# Patient Record
Sex: FEMALE | Race: WHITE | NOT HISPANIC OR LATINO | Employment: FULL TIME | URBAN - METROPOLITAN AREA
[De-identification: names, ages, dates, MRNs, and addresses within clinical notes are randomized per-mention and may not be internally consistent; named-entity substitution may affect disease eponyms.]

---

## 2022-12-29 ENCOUNTER — OFFICE VISIT (OUTPATIENT)
Dept: PRIMARY CARE CLINIC | Facility: CLINIC | Age: 27
End: 2022-12-29
Payer: COMMERCIAL

## 2022-12-29 VITALS
OXYGEN SATURATION: 100 % | DIASTOLIC BLOOD PRESSURE: 74 MMHG | SYSTOLIC BLOOD PRESSURE: 112 MMHG | BODY MASS INDEX: 31.24 KG/M2 | RESPIRATION RATE: 18 BRPM | WEIGHT: 176.38 LBS | HEART RATE: 110 BPM

## 2022-12-29 DIAGNOSIS — Z83.3 FAMILY HISTORY OF DIABETES MELLITUS (DM): ICD-10-CM

## 2022-12-29 DIAGNOSIS — E66.9 CLASS 1 OBESITY: ICD-10-CM

## 2022-12-29 DIAGNOSIS — Z86.39 HISTORY OF INSULIN RESISTANCE: Primary | ICD-10-CM

## 2022-12-29 PROBLEM — E66.811 CLASS 1 OBESITY: Status: ACTIVE | Noted: 2022-12-29

## 2022-12-29 PROCEDURE — 1159F MED LIST DOCD IN RCRD: CPT | Mod: CPTII,S$GLB,, | Performed by: FAMILY MEDICINE

## 2022-12-29 PROCEDURE — 1159F PR MEDICATION LIST DOCUMENTED IN MEDICAL RECORD: ICD-10-PCS | Mod: CPTII,S$GLB,, | Performed by: FAMILY MEDICINE

## 2022-12-29 PROCEDURE — 3078F DIAST BP <80 MM HG: CPT | Mod: CPTII,S$GLB,, | Performed by: FAMILY MEDICINE

## 2022-12-29 PROCEDURE — 3008F BODY MASS INDEX DOCD: CPT | Mod: CPTII,S$GLB,, | Performed by: FAMILY MEDICINE

## 2022-12-29 PROCEDURE — 99999 PR PBB SHADOW E&M-EST. PATIENT-LVL IV: CPT | Mod: PBBFAC,,, | Performed by: FAMILY MEDICINE

## 2022-12-29 PROCEDURE — 99203 OFFICE O/P NEW LOW 30 MIN: CPT | Mod: S$GLB,,, | Performed by: FAMILY MEDICINE

## 2022-12-29 PROCEDURE — 99203 PR OFFICE/OUTPT VISIT, NEW, LEVL III, 30-44 MIN: ICD-10-PCS | Mod: S$GLB,,, | Performed by: FAMILY MEDICINE

## 2022-12-29 PROCEDURE — 1160F PR REVIEW ALL MEDS BY PRESCRIBER/CLIN PHARMACIST DOCUMENTED: ICD-10-PCS | Mod: CPTII,S$GLB,, | Performed by: FAMILY MEDICINE

## 2022-12-29 PROCEDURE — 1160F RVW MEDS BY RX/DR IN RCRD: CPT | Mod: CPTII,S$GLB,, | Performed by: FAMILY MEDICINE

## 2022-12-29 PROCEDURE — 3008F PR BODY MASS INDEX (BMI) DOCUMENTED: ICD-10-PCS | Mod: CPTII,S$GLB,, | Performed by: FAMILY MEDICINE

## 2022-12-29 PROCEDURE — 3078F PR MOST RECENT DIASTOLIC BLOOD PRESSURE < 80 MM HG: ICD-10-PCS | Mod: CPTII,S$GLB,, | Performed by: FAMILY MEDICINE

## 2022-12-29 PROCEDURE — 3074F SYST BP LT 130 MM HG: CPT | Mod: CPTII,S$GLB,, | Performed by: FAMILY MEDICINE

## 2022-12-29 PROCEDURE — 3074F PR MOST RECENT SYSTOLIC BLOOD PRESSURE < 130 MM HG: ICD-10-PCS | Mod: CPTII,S$GLB,, | Performed by: FAMILY MEDICINE

## 2022-12-29 PROCEDURE — 99999 PR PBB SHADOW E&M-EST. PATIENT-LVL IV: ICD-10-PCS | Mod: PBBFAC,,, | Performed by: FAMILY MEDICINE

## 2022-12-29 RX ORDER — SEMAGLUTIDE 1.34 MG/ML
INJECTION, SOLUTION SUBCUTANEOUS
Qty: 1 PEN | Refills: 1 | Status: SHIPPED | OUTPATIENT
Start: 2022-12-29 | End: 2023-02-20 | Stop reason: SDUPTHER

## 2022-12-29 NOTE — PATIENT INSTRUCTIONS
"Nutritional Yeast (1.5-2 tbsp= serving)  Parish Rice  Eleno Kesha bread   Lesser Evil popcorn  Beanitos          PRODUCE  [] All fresh fruit   [] All fresh vegetables   [] All fresh herbs  [] All herb purees + pastes  [] Pre-spiralized vegetable noodles   [] Steam-In-The-Bag begetables  [] Riced cauliflower  [] Jicama sticks  [] Love Beets  all varieties  [] Wholly Guacamole  all varieties  [] Hummus  all varieties, chickpea + vegetable  [] Tofu Shirataki noodles    [] Tofu  all varieties  [] Tempeh  all varieties    PROTEIN  CHICKEN   [] Boneless, skinless breasts  [] Boneless, skinless thighs  [] Ground chicken breast, at least 93% lean  [] Chicken breast cutlet  [] Aidell's  Chicken Sausage + Chicken Meatballs    TURKEY   [] Turkey breast tenderloin   [] Ground turkey breast, at least 93% lean  [] [place] Naturals  Turkey Sausage    BEEF  [] Tenderloin  [] Sirloin  [] Top Loin  [] Flank Steak  [] Round Steak  [] Filet  [] Lean ground beef, at least 93% lean + grass-fed preferable    PORK  [] Tenderloin  [] Pork Chop  [] Center Cut  [] Aimee Naturals  No-Sugar Rivera    BISON  [] Duson  90 - 95% lean    SEAFOOD  [] All fresh fish + seafood; locally sourced when possible  [] Smoked salmon    HEAT + EAT ENTREES   [] Carlton's Natural Foods  Chicken, Pork, Beef  [] Von  "All Natural" Grilled Chicken Breast + Strips, all varieties    SAUCES SPREADS + DIPS  [] Bitchin Sauce  Original, Chipotle, Cilantro [place]  [] Melecio's Kitchen  Tzatziki Yogurt Dip, Babaganoush, Hummus  [] Wholly Guacamole  all varieties  [] Hummus  all varieties  [] Errol Gringo Salsa  all varieties  [] Mrs. Lashon's Salsa  all varieties  [] Stubb's All Natural BBQ Sauce  [] Primal Kitchen  Young, Ketchup, BBQ Sauce  [] Primal Kitchen Pasta Sauce  Roasted Garlic, Tomato Basil, no-dairy Vodka Sauce  [] Sal & Mariana's  HeartSmart Pasta Sauce    DAIRY/DAIRY SUBSTITUTES/EGGS  EGGS   [] All eggs  cage-free, pasture-raise " preferable  [] Crepini  egg wraps  [] Vital Farms  Pasture-Raised Egg Bites  [] JUST Egg [vegan]     CREAMERS   [] Califia  Better Half, original + vanilla unsweetened  [] NutPods  all varieties    MILK   [] Horizon Organic  all varieties except chocolate  [] Organic Valley  all varieties except chocolate  [] Organic Valley  ultra-filtered, reduced fat milk     PLANT_BASED MILK ALTERNATIVES  [] All unsweetened almond milks  original, vanilla + chocolate  [] Ripple  unsweetened   [] Milkadamia  original +_ vanilla, unsweetened   [] Forager  original + vanilla, unsweetened   [] Silk Organic  soy milk, unsweetened  [] Oatly  unsweetened  [] Califia  regular + protein-fortified oat milk, unsweetened     CHEESES  [] Regular or reduced fat cheeses  [] BelGioso  Fresh Mozzarella Snack Packs, Parmesan Power-full Snack   [] Goat cheese  [] Fresh mozzarella  [] String cheese  all varieties  [] Maria R Cottage Cheese  [] Camelia's Cultured Cottage Cheese  [] Tanesha Life 'Just Like Mozzarella'  plant-based shreds and other varieties  [] Parmela Creamery  plant-based shredded cheese    YOGURT  [] Fage  2% low-fat, plain  [] Siggi's  plain, vanilla  [] Chobani Greek  nonfat + whole milk yogurt, plain   [] Chobani Less Sugar  all flavored varieties   [] Oikos Greek  nonfat, plain  [] Two Good  all varieties   [] Telugu Provisions  plain  [] [place] Organic  low-fat + nonfat, plain  [] [facility]  goat milk yogurt, plain  [] Kefit  unsweetened, plain  [] Forager  Greek style unsweetened, plain [dairy-free]  [] [place]  unsweetened Greek style, plain [dairy-free]  [] [facility]  almond milk yogurt, vanilla or plain, unsweetened [dairy-free]    FREEZER SECTION  FROZEN VEGGIES  [] All plain frozen veggies + greens [e.g. broccoli, brussels, carrots, okra, mushrooms, zucchini, yellow squash, butternut squash, kale, spinach, hayley greens]  [] Riced veggies [e.g. cauliflower, broccoli, butternut  squash]  [] Edamame  all varieties  [] Green Giant  [] Veggie Spirals  [] Marinated Veggies [e.g. eggplant, peppers, zucchini]  [] Simply Steam [place] Sprouts  [] Birds Eye  [] Power Blend Italian Style  lentils, broccoli, kale, zucchini  []  [place] Sprouts & Carrots  [] Oven Roasters Broccoli & Cauliflower  [] [place] Blend  [] Tattooed   [] Green Bean Blend  [] Farmer's Market Ratatouille  [] Butter Balsamic Glazed Vegetables  [] Riced Cauliflower & Quinoa Mediterranean Style  [] Lupis's Good Life  Southern Style Greens [sauteed kale + onion]    FROZEN FRUITS  [] All unsweetened frozen fruits  all varieties  [] Dole Fruits & Veggie Blends  Berries 'n Kale  [] Dole Mix-ins  Triple Berry     FROZEN ENTREES  [] The Good Kitchen meals  all varieties [ e.g. Chili Lime Chicken Over Riced Cauliflower]  [] Premium Paleo  Not Stanley Momma's Meatloaf  [] Primal Kitchen  Chicken Pesto + Steak Fajitas w/ Peppers & Onions  [] Eating Well Frozen Entrees  Butter Chicken Masala, Steak Carne Asada, Creamy Pesto Chicken, Chicken + Wild Rice Stroganoff, Yellow Jones Chicken, Sun-dried Tomato Chicken, Chicken Lo Mein  [] Realgood Entree Bowls  Sammarinese Inspired Beef Bowl over Riced Cauliflower, Chicken Burrito Bowl   [] Great Karma Coconut Jones  [] Maricel's  Tamale Abril with Black Beans, Vegetable Lasagna  [] Kashi Mayan Blairs Bake  [] Healthy Choice  Simply Steamers Chicken Fried Rice  [] Basil Pesto Chicken & Central African Style Pork Power Bowls  [] Tattooed   Enchilada Bowl  [] Lalo Farms  Spicy Black Bean Burgers    FROZEN PIZZAS  [] Cauli'flour Foods  Cauliflower Pizza Crusts  [] Outer Aisle  Cauliflower Crust  [] Maricel's  Veggie Crust Cheese Pizza  [] Quest Pizza     VEGETARIAN PRODUCTS  [] Beyond Meat  ground 'meat' + grilled 'chicken' strips  [] Tofurkey  Original Italian Sausage + Original Tempeh  [] Gardein  Beefless Ground + Meatless Meatballs  [] [facility] Judith   Original Burger, Crumbles, Meatballs    ICE CREAMS + FROZEN DESSERTS  [] Halo Top  regular + keto series, pops  [] Rebel  ice cream + dessert sandwiches  [] Enlightened  ice cream + bars  [] Nightfood  ice cream  [] Realgood  ice cream  [] Arctic Zero Fit  frozen pint  [] The Frozen Farmer  sorbets  [] Wholly Rollies  Protein Balls, all varieties  [] Dream Pops  Coconut Latte    FROZEN BREAKFASTS  [] Realgood  Breakfast Sandwiches on Cauliflower Cheesy Bread  [] Rebel  ice cream + dessert sandwiches  [] Enlightened  ice cream + bars  [] Nightfood  ice cream  [] Realgood  ice cream  [] Arctic Zero Fit  frozen pint  [] The Frozen Farmer  sorbets  [] Wholly Rollies  Protein Balls, all varieties  [] Dream Pops  Coconut Latte    BREADS/BUNS/WRAPS  [] Benjamin Bread: All Types - In Freezer Section   [] Flat Out Light Wraps - All Varieties   [] Flat Out Protein Up Carb Down Flat Bread   [] Kontos Whole Wheat Pocket Kesha   [] Gautam NICHOLAS 100% Whole Wheat Tortillas   [] LaTortilla Factory Tortillas - Smart & Delicious; 50 or 80-calorie   [] Nature's Own 100% Whole Wheat Bread   [] Orowheat Healthful - 100% Whole Wheat Slice Bread and Howard Thins   [] Orowheat Healthful - Whole Wheat Nuts & Grain Bread; Flax & Seed Bread   [] Pepperidge Farm Natural Whole Grain 15 Bread   [] Pepperidge Farm Natural Whole Grain English Muffin - 100% Whole Wheat   [] [place] Farm Very Thin 100% Whole Wheat   [] Kaylin Selvin 45 Calories and Delightful   [] Iam' 100% Whole Wheat Thin-Sliced Bagels and English Muffins   [] Western Bagel: Perfect 10     GLUTEN FREE  [] Domingo's Gluten Free Bread   [] [place] Bakehouse 7-Grain Gluten Free Bread     LEGUME PASTA   [] Explore Asian Organic Black Bean Spaghetti   [] Modern Table   [] Tolerant Foods       NUT BUTTERS & JELLIES    NUT BUTTERS   [] Better'n Chocolate: Coconut Chocolate Peanut Butter Spread   [] Better'n Peanut Butter - All Types   [] Earth Balance Coconut and Peanut Spread    [] Francisco's Nut DeCordova   [] MaraNatha: All Natural Roasted Cashew Butter - Kent or Creamy   [] MaraNatha: Roasted Peanut Butter   [] Nuts 'N More Peanut DeCordova - All Flavors   [] PB2 Powder - Original or Chocolate   [] Skippy Natural - Creamy, Super Chunk   [] Smart Balance Peanut Butter - Kent or Creamy   [] Peanut Butter & Company:   [] Smooth , Crunch Time, The Heat Is On, Old Fashioned Smooth, Mighty Nut- Powdered Peanut Butter, Squeeze Pack   [] Smucker's Natural Peanut Butter - Kent or Creamy   [] Sunbutter Nut Butter   [] Wild Friends Protein Peanut Butter/Emerson o Butter - Vanilla or Chocolate     JELLIES  o Polaner's All Fruit   o Clearly Organic Best Choice: Strawberry Fruit Spread       SNACKS    BARS  [] Kashi Bars - Chewy or Crunchy; Honey Emerson o Flax or Peanut Butter   [] KIND Bars - 5 Grams of Sugar or Less   [] KIND Protein Bars - Strong and KIND   [] Nature Valley Protein Bar - All Varieties   [] Nature Valley Roasted Nut Crunch - Emerson Crunch; Peanut Crunch   [] Hassler Health Farm Simple Nut Bar - Roasted Peanut & Honey   [] Hassler Health Farm Simple Nut Bar - Emerson, Cashew & Sea Salt   [] Hassler Health Farm Nut Marathon Bar - Salted Caramel Peanut   [] Think Thin Protein Bars   [] Quest Bars, Power Crunch Bars, Pure Protein Bars     BEEF JERKY - NITRATE FREE   [] Game On   [] Grass Run Farms   [] Krave   [] Ostrim   [] Perky Jerky   [] Primal Strips Meatless Vegan Jerky   [] [place]     CHIPS   [] Beanitos Chips   [] Fruit Crisps - e.g. Brother's-All-Natural, Bare Fruit, Yoga Chips   [] Katie's Soy Crisps: 1.3 ounce bag   [] Quest Protein Chips   [] Wasa Whole Wheat Crisp Bread     CRACKERS  [] Babita's Gone Crackers   [] Nabisco Triscuit: Regular and Thin Crisp Crackers   [] Vans Say Cheese Crackers (G-F)     POPCORN/NUTS   [] Gordon Braswell's Smart Pop Popcorn - Single Serving   [] 100-Calorie Pack of Nuts - All Varieties     PROTEIN POWDERS & DRINKS  []  Protein -  Whey  Protein Powder   [] Garden of Life Raw Protein Powder   [] Iconic Ready-To-Drink Protein Drink   [] Richardson One Protein Powder   [] VegaSport Protein Powder     SALSA/HUMMUS/DIPS   [] Eat Well Embrace Life: Zesty Sriracha Carrot o Hummus   [] Pre-Portioned Guacamole Packs   [] Pearl's   [] Tostitos Restaurant Style Salsa       SOUPS   [] Maricel's Organic Soups - Lentil, Vegetable, Split Pea, Low-Sodium     CANNED GOODS   [] 100% Pure Pumpkin   [] BlueRunner Creole Cream-Style Red Beans or Navy Beans   [] Cajun Power Chicken Gumbo Base   [] Chicken of the Sea Goodman [place]   [] Andrés Fresh Cut Sliced Beets   [] Hormel Breast of Chicken in Water   [] LeSuer Tender Baby Whole Carrots   [] Nicole Tabasco [place] Starter   [] Carlosist: Chunk Lite Tuna in Water, Gourmet Select Pouches   [] Carlosist: Yellowfin Tuna Fillets   [] Trappey's: Kidney, Butter, Almendarez, Black Eye, Field, and Black Beans   [] CECIL Saunders's Turnip Greens or Torrey Spinach     CONDIMENTS/ SAUCES/SPREADS/ SPICES  [] Aneesh Cobb's Magic Seasonings - Regular or Salt Free   [] Yunior Tovar's Sauces - All Flavors   [] Laughing Cow Light - All Flavors   [] Dash Salt-Free Marinade - All Flavors   [] Hang & Mariana's: Heart Smart Pasta Sauces   [] Tabasco     SALAD DRESSINGS  [] Kristal's Naturals: Lite Honey Mustard   [] Mackey's Own: Lighten Up Salad Dressing - All Varieties   [] OPA Greek Yogurt Dressings - Ranch, Blue o Cheese, Caesar, Feta Dill     SWEETENERS  [] Sweet [place] Sweetener   [] Swerve   [] Truvia     BEVERAGES  [] Coconut Water   [] Crystal Light PURE - All Flavors   [] Honest Tea: Just Green Tea, Unsweetened   [] Kombucha Tea   [] [place]   [] [place] Sisters Bloody Babita Mix   [] Metromint - Zero-Sugar; All Natural Flavored   [] Jaime - Plain or Flavored   [] Mraguerite Padilla   [] Steaz - Zero-Sugar, All-Natural, Sparkling Tea   [] Tea Bags: Any Brand - e.g. Nikki, Yogi, Tazzo, Celestial   [] V8 100% Vegetable Juice   [] Vitamin Water Zero    [] Water   [] Zevia - Stevia Sweetened Soft Drink     BEER/NIKOLAY/LIQUORS  []Payne's Premier Light 64 Calories   [] Bud Select - 55 Calories   [] [place] Sisters Bloody Babita Mix   [] Costa Genuine Draft - 64 Calories   [] Red or White Wine - All Varieties     CEREALS: HOT/COLD   [] Argelia'nayeli Hahn's Original Cereal  [] Eldon's Mill Oat Bran Hot Cereal - Cracked Wheat, Multi-Grain  [] Kashi GoLean Cereal  [] Kashi GoLean Hot Cereal packets - Vanilla; Honey Cinnamon  [] Tong's Special K Protein Cereal  [] Nitin's Steel Cut Frisian Oatmeal  [] Nature's Path Smart Bran  [] Mu-ism Instant Oatmeal packet, Original  [] Mu-ism Old Fashioned Mu-ism Oats  [] Uncle Rajesh's Whole Wheat & Flaxseed Original Cereal

## 2022-12-29 NOTE — PROGRESS NOTES
Subjective:       Patient ID: Amanda Mcbride is a 27 y.o. female.    Pmhx, fam hx, soc hx, surg hx, allergies, med list reviewed    Referring MD: Lazaro  PCP: Babita Vitale  BMI noted 31  Diet: varibale  Exercise/Activity: light  Not as active with job  Sleep: good  Stressors: daughter 3  Anxiety/Depression Screen/PHQ-2: neg  Pt works in a plant/office  Had death in family and dog as well    Chief Complaint: weight gain    Hx of insulin resistance. How started metformin for this originally. Had labs earlier this year.   No hx of gestational DM   No known metabolic syndrome.   No blood pressure issues.       Does not like fruit    ON metformin--had gi upset and is resistant to trying again. Pt was on regular metformin.   Would like to try injectable. Was on this with [facility].    Pt had daughter in 2019: lost to 145 and felt well. She has spoken with Dr Willis about wanting to have another baby.     Has been snacking more: chips/larger portion, eats dinner and going to bed.   She does eat from boredom some times.     Bfast: hot pocket bfast (1) or instant grits/sometimes hungrier  Lunch: leftovers  Dinner: rice gravy (Parish), animal protein, veggies  Sometimes mashed potatoes  Occasional bread    Likes veggies  Water: okay    Soda: drinks 1-2/day but now going back to zero sugar  Coffee: Vanilla zero sugar          HPI  Review of Systems   Constitutional:  Negative for activity change, appetite change, fatigue and fever.   HENT:  Negative for mouth dryness and goiter.    Eyes:  Negative for visual disturbance.   Respiratory:  Negative for apnea, cough, chest tightness and shortness of breath.    Cardiovascular:  Negative for chest pain, palpitations and leg swelling.   Gastrointestinal:  Negative for abdominal pain, constipation and diarrhea.   Endocrine: Negative for cold intolerance, heat intolerance, polydipsia, polyphagia and polyuria.   Genitourinary:  Negative for frequency and menstrual problem.    Musculoskeletal:  Negative for arthralgias and myalgias.   Integumentary:  Negative for color change and rash.   Psychiatric/Behavioral:  Negative for sleep disturbance. The patient is not nervous/anxious.        Objective:      Physical Exam  Vitals and nursing note reviewed.   Constitutional:       General: She is not in acute distress.  HENT:      Head: Normocephalic and atraumatic.   Eyes:      General: No scleral icterus.     Pupils: Pupils are equal, round, and reactive to light.   Neck:      Comments: No TM  Cardiovascular:      Rate and Rhythm: Normal rate and regular rhythm.      Pulses: Normal pulses.      Heart sounds: Normal heart sounds. No murmur heard.    No friction rub. No gallop.   Pulmonary:      Effort: Pulmonary effort is normal. No respiratory distress.      Breath sounds: Normal breath sounds. No wheezing, rhonchi or rales.   Abdominal:      General: Bowel sounds are normal. There is no distension.      Palpations: Abdomen is soft.      Tenderness: There is no abdominal tenderness.   Musculoskeletal:         General: No swelling.      Cervical back: Normal range of motion and neck supple. No tenderness.   Lymphadenopathy:      Cervical: No cervical adenopathy.   Skin:     General: Skin is warm.      Capillary Refill: Capillary refill takes less than 2 seconds.      Findings: No erythema or rash.   Neurological:      Mental Status: She is alert and oriented to person, place, and time.   Psychiatric:         Mood and Affect: Mood normal.         Behavior: Behavior normal.       Assessment:       1. History of insulin resistance    2. Family history of diabetes mellitus (DM)    3. Class 1 obesity              Plan:       History of insulin resistance  -     semaglutide (OZEMPIC) 0.25 mg or 0.5 mg(2 mg/1.5 mL) pen injector; Inject 0.25 mg subq weekly x 2 weeks then 0.5 mg subq weekly  Dispense: 1 pen; Refill: 1    Family history of diabetes mellitus (DM)  -     semaglutide (OZEMPIC) 0.25 mg or  0.5 mg(2 mg/1.5 mL) pen injector; Inject 0.25 mg subq weekly x 2 weeks then 0.5 mg subq weekly  Dispense: 1 pen; Refill: 1    Class 1 obesity  -     semaglutide (OZEMPIC) 0.25 mg or 0.5 mg(2 mg/1.5 mL) pen injector; Inject 0.25 mg subq weekly x 2 weeks then 0.5 mg subq weekly  Dispense: 1 pen; Refill: 1          40 min total spent with patient    Some food choices:  Nutritional yeast  Parish rice  Iced coffee/protein shakes  Pt is aware of lifestyle change    Risks/benefits/common side effects of medication discussed with patient at length. UTD patient handout given. (mychart msg)  D/W pt at length potential pharmacologic options; she desires consideration of ozempic. Risks/benefits/common side effects of ozempic d/w pt including nausea and pain at injection site; she is aware should not get pregnant while taking and not approved while breastfeeding. NO personal/fam hx of MEN or medullary thyroid cancer. No hx of pancreatitis. Instructed pt how to use with demo pen; pt practiced in office. Pt advised if approved will get pt handout from pharmacy. Pt has no hx of thyroid nodules or biliary disease/gallstones. DW pt with substantial or rapid weight loss gallstones could develop. Also dw pt glp-1 MOA and common side effects.        F/u 2-4 weeks

## 2023-01-02 ENCOUNTER — PATIENT MESSAGE (OUTPATIENT)
Dept: PRIMARY CARE CLINIC | Facility: CLINIC | Age: 28
End: 2023-01-02
Payer: COMMERCIAL

## 2023-02-03 ENCOUNTER — PATIENT MESSAGE (OUTPATIENT)
Dept: PRIMARY CARE CLINIC | Facility: CLINIC | Age: 28
End: 2023-02-03
Payer: COMMERCIAL

## 2023-02-10 ENCOUNTER — OFFICE VISIT (OUTPATIENT)
Dept: PRIMARY CARE CLINIC | Facility: CLINIC | Age: 28
End: 2023-02-10
Payer: COMMERCIAL

## 2023-02-10 VITALS — HEIGHT: 63 IN | BODY MASS INDEX: 29.77 KG/M2 | WEIGHT: 168 LBS

## 2023-02-10 DIAGNOSIS — E66.9 CLASS 1 OBESITY: ICD-10-CM

## 2023-02-10 DIAGNOSIS — Z86.39 HISTORY OF INSULIN RESISTANCE: Primary | ICD-10-CM

## 2023-02-10 PROCEDURE — 99213 OFFICE O/P EST LOW 20 MIN: CPT | Mod: 95,,, | Performed by: FAMILY MEDICINE

## 2023-02-10 PROCEDURE — 99213 PR OFFICE/OUTPT VISIT, EST, LEVL III, 20-29 MIN: ICD-10-PCS | Mod: 95,,, | Performed by: FAMILY MEDICINE

## 2023-02-10 PROCEDURE — 3008F PR BODY MASS INDEX (BMI) DOCUMENTED: ICD-10-PCS | Mod: CPTII,95,, | Performed by: FAMILY MEDICINE

## 2023-02-10 PROCEDURE — 1160F PR REVIEW ALL MEDS BY PRESCRIBER/CLIN PHARMACIST DOCUMENTED: ICD-10-PCS | Mod: CPTII,95,, | Performed by: FAMILY MEDICINE

## 2023-02-10 PROCEDURE — 1159F PR MEDICATION LIST DOCUMENTED IN MEDICAL RECORD: ICD-10-PCS | Mod: CPTII,95,, | Performed by: FAMILY MEDICINE

## 2023-02-10 PROCEDURE — 3008F BODY MASS INDEX DOCD: CPT | Mod: CPTII,95,, | Performed by: FAMILY MEDICINE

## 2023-02-10 PROCEDURE — 1160F RVW MEDS BY RX/DR IN RCRD: CPT | Mod: CPTII,95,, | Performed by: FAMILY MEDICINE

## 2023-02-10 PROCEDURE — 1159F MED LIST DOCD IN RCRD: CPT | Mod: CPTII,95,, | Performed by: FAMILY MEDICINE

## 2023-02-10 NOTE — PROGRESS NOTES
Subjective:       Patient ID: Amanda Mcbride is a 27 y.o. female.  Pmhx, fam hx, soc hx, surg hx, allergies, med list reviewed    The [place] location is: [place]  The chief complaint leading to consultation is: follow up    Visit type: audiovisual    Face to Face time with patient: 17  22 minutes of total time spent on the encounter, which includes face to face time and non-face to face time preparing to see the patient (eg, review of tests), Obtaining and/or reviewing separately obtained history, Documenting clinical information in the electronic or other health record, Independently interpreting results (not separately reported) and communicating results to the patient/family/caregiver, or Care coordination (not separately reported).         Each patient to whom he or she provides medical services by telemedicine is:  (1) informed of the relationship between the physician and patient and the respective role of any other health care provider with respect to management of the patient; and (2) notified that he or she may decline to receive medical services by telemedicine and may withdraw from such care at any time.      Chief Complaint: follow up    Pt reports doing well overall. Still has an appetite but when eats is able to tolerate less.   Has been on the medication for a month. NO constipation, abd pain, neck/throat pain. NO nausea.    Tolerating ocp. Hx of class 1 obesity and insulin resistance.   She has had no access issues.   Sleep has been good.   Exercise: some but not as much as would like. Yesterday walked 1 mile at work/lunch break.  BMI down into 20s--from 176. Feeling very well; not sluggish, more energy.     NO pregnancy concerns.       HPI  Review of Systems   Constitutional:  Negative for activity change, appetite change, fatigue and fever.   HENT:  Negative for mouth dryness and goiter.    Eyes:  Negative for visual disturbance.   Respiratory:  Negative for apnea, cough, chest tightness and  shortness of breath.    Cardiovascular:  Negative for chest pain, palpitations and leg swelling.   Gastrointestinal:  Negative for abdominal pain, constipation and diarrhea.   Endocrine: Negative for cold intolerance, heat intolerance, polydipsia, polyphagia and polyuria.   Genitourinary:  Negative for frequency and menstrual problem.   Musculoskeletal:  Negative for arthralgias and myalgias.   Integumentary:  Negative for color change and rash.   Psychiatric/Behavioral:  Negative for sleep disturbance. The patient is not nervous/anxious.        Objective:      Physical Exam  Constitutional:       General: She is not in acute distress.     Appearance: Normal appearance.   HENT:      Head: Normocephalic and atraumatic.   Eyes:      General: No scleral icterus.  Pulmonary:      Effort: Pulmonary effort is normal. No respiratory distress.   Neurological:      Mental Status: She is alert and oriented to person, place, and time.   Psychiatric:         Mood and Affect: Mood normal.         Behavior: Behavior normal.       Assessment:         1. History of insulin resistance    2. Class 1 obesity        Plan:       History of insulin resistance  Class 1 obesity  BMI down from class 1 to 29 (depending on subjective home scale weights)  Pt to stay on 0.5 mg at this time; will plan to increase to 1 mg in the future if needed  Increase exercise by 2x/week      Declines different appt for lifestyle interventions at this time; may be open to it in the future

## 2023-03-06 ENCOUNTER — PATIENT MESSAGE (OUTPATIENT)
Dept: PRIMARY CARE CLINIC | Facility: CLINIC | Age: 28
End: 2023-03-06
Payer: COMMERCIAL

## 2023-03-06 DIAGNOSIS — Z83.3 FAMILY HISTORY OF DIABETES MELLITUS (DM): ICD-10-CM

## 2023-03-06 DIAGNOSIS — Z86.39 HISTORY OF INSULIN RESISTANCE: Primary | ICD-10-CM

## 2023-03-06 DIAGNOSIS — E66.9 CLASS 1 OBESITY: ICD-10-CM

## 2023-03-06 RX ORDER — SEMAGLUTIDE 1.34 MG/ML
1 INJECTION, SOLUTION SUBCUTANEOUS
Qty: 1 PEN | Status: SHIPPED | OUTPATIENT
Start: 2023-03-06 | End: 2023-04-24 | Stop reason: SDUPTHER

## 2023-04-24 ENCOUNTER — PATIENT MESSAGE (OUTPATIENT)
Dept: PRIMARY CARE CLINIC | Facility: CLINIC | Age: 28
End: 2023-04-24
Payer: COMMERCIAL

## 2023-04-24 DIAGNOSIS — E66.9 CLASS 1 OBESITY: ICD-10-CM

## 2023-04-24 DIAGNOSIS — Z83.3 FAMILY HISTORY OF DIABETES MELLITUS (DM): ICD-10-CM

## 2023-04-24 DIAGNOSIS — Z86.39 HISTORY OF INSULIN RESISTANCE: ICD-10-CM

## 2023-04-24 RX ORDER — SEMAGLUTIDE 1.34 MG/ML
1 INJECTION, SOLUTION SUBCUTANEOUS
Qty: 3 ML | Status: SHIPPED | OUTPATIENT
Start: 2023-04-24 | End: 2023-05-02 | Stop reason: SDUPTHER

## 2023-05-02 ENCOUNTER — PATIENT MESSAGE (OUTPATIENT)
Dept: PRIMARY CARE CLINIC | Facility: CLINIC | Age: 28
End: 2023-05-02
Payer: COMMERCIAL

## 2023-05-02 DIAGNOSIS — E66.9 CLASS 1 OBESITY: ICD-10-CM

## 2023-05-02 DIAGNOSIS — Z83.3 FAMILY HISTORY OF DIABETES MELLITUS (DM): ICD-10-CM

## 2023-05-02 DIAGNOSIS — Z86.39 HISTORY OF INSULIN RESISTANCE: ICD-10-CM

## 2023-05-02 RX ORDER — SEMAGLUTIDE 1.34 MG/ML
1 INJECTION, SOLUTION SUBCUTANEOUS
Qty: 3 ML | Status: SHIPPED | OUTPATIENT
Start: 2023-05-02 | End: 2023-05-09

## 2023-05-05 ENCOUNTER — PATIENT MESSAGE (OUTPATIENT)
Dept: PRIMARY CARE CLINIC | Facility: CLINIC | Age: 28
End: 2023-05-05
Payer: COMMERCIAL

## 2023-05-05 DIAGNOSIS — Z83.3 FAMILY HISTORY OF DIABETES MELLITUS (DM): ICD-10-CM

## 2023-05-05 DIAGNOSIS — Z86.39 HISTORY OF INSULIN RESISTANCE: Primary | ICD-10-CM

## 2023-05-05 DIAGNOSIS — E66.9 CLASS 1 OBESITY: ICD-10-CM

## 2023-05-09 RX ORDER — SEMAGLUTIDE 1.34 MG/ML
0.5 INJECTION, SOLUTION SUBCUTANEOUS
Qty: 3 ML | Refills: 1 | Status: SHIPPED | OUTPATIENT
Start: 2023-05-09 | End: 2023-07-05

## 2023-05-30 ENCOUNTER — OFFICE VISIT (OUTPATIENT)
Dept: PRIMARY CARE CLINIC | Facility: CLINIC | Age: 28
End: 2023-05-30
Payer: COMMERCIAL

## 2023-05-30 DIAGNOSIS — E66.9 CLASS 1 OBESITY: ICD-10-CM

## 2023-05-30 DIAGNOSIS — Z86.39 HISTORY OF INSULIN RESISTANCE: Primary | ICD-10-CM

## 2023-05-30 DIAGNOSIS — R11.0 NAUSEA: ICD-10-CM

## 2023-05-30 PROCEDURE — 1159F MED LIST DOCD IN RCRD: CPT | Mod: CPTII,95,, | Performed by: FAMILY MEDICINE

## 2023-05-30 PROCEDURE — 1160F PR REVIEW ALL MEDS BY PRESCRIBER/CLIN PHARMACIST DOCUMENTED: ICD-10-PCS | Mod: CPTII,95,, | Performed by: FAMILY MEDICINE

## 2023-05-30 PROCEDURE — 1159F PR MEDICATION LIST DOCUMENTED IN MEDICAL RECORD: ICD-10-PCS | Mod: CPTII,95,, | Performed by: FAMILY MEDICINE

## 2023-05-30 PROCEDURE — 99213 OFFICE O/P EST LOW 20 MIN: CPT | Mod: 95,,, | Performed by: FAMILY MEDICINE

## 2023-05-30 PROCEDURE — 99213 PR OFFICE/OUTPT VISIT, EST, LEVL III, 20-29 MIN: ICD-10-PCS | Mod: 95,,, | Performed by: FAMILY MEDICINE

## 2023-05-30 PROCEDURE — 1160F RVW MEDS BY RX/DR IN RCRD: CPT | Mod: CPTII,95,, | Performed by: FAMILY MEDICINE

## 2023-05-30 NOTE — PROGRESS NOTES
Subjective   Pmhx, fam hx, soc hx, surg hx, allergies, med list reviewed    The patient location is: [place]  The chief complaint leading to consultation is: follow up    Visit type: audiovisual    Face to Face time with patient: 10  12 minutes of total time spent on the encounter, which includes face to face time and non-face to face time preparing to see the patient (eg, review of tests), Obtaining and/or reviewing separately obtained history, Documenting clinical information in the electronic or other health record, Independently interpreting results (not separately reported) and communicating results to the patient/family/caregiver, or Care coordination (not separately reported).         Each patient to whom he or she provides medical services by telemedicine is:  (1) informed of the relationship between the physician and patient and the respective role of any other health care provider with respect to management of the patient; and (2) notified that he or she may decline to receive medical services by telemedicine and may withdraw from such care at any time.    Notes:    Patient ID: Amanda Mcbride is a 28 y.o. female.    Chief Complaint: f/u weight gain/insulin resistance  Pt reports insurance is paying for ozempic. She had  nausea 2 months ago. Tended to be 2-3 days after injection. Had n/v. Zofran did help. She stopped and restarted on lower dosage.     Pt had eye check up and was okay.    Denies pregnancy concerns.     Pt is on 0.25 mg dosage for that box. She has had heightened appetite. She will increase to 0.5 mg. No constipation. NO GERD. No throat/neck pain. No abd pain    Pt self reported weight 165-167.    HPI  Review of Systems   Constitutional:  Negative for activity change, appetite change, fatigue and fever.   HENT:  Negative for mouth dryness and goiter.    Eyes:  Negative for visual disturbance.   Respiratory:  Negative for apnea, cough, chest tightness and shortness of breath.     Cardiovascular:  Negative for chest pain, palpitations and leg swelling.   Gastrointestinal:  Negative for abdominal pain, constipation and diarrhea.   Endocrine: Negative for cold intolerance, heat intolerance, polydipsia, polyphagia and polyuria.   Genitourinary:  Negative for frequency and menstrual problem.   Musculoskeletal:  Negative for arthralgias and myalgias.   Integumentary:  Negative for color change and rash.   Psychiatric/Behavioral:  Negative for sleep disturbance. The patient is not nervous/anxious.         Objective     Physical Exam  Vitals and nursing note reviewed.   Constitutional:       General: She is not in acute distress.  HENT:      Head: Normocephalic and atraumatic.   Eyes:      General: No scleral icterus.     Pupils: Pupils are equal, round, and reactive to light.   Neck:      Comments: No TM  Cardiovascular:      Rate and Rhythm: Normal rate and regular rhythm.      Pulses: Normal pulses.      Heart sounds: Normal heart sounds. No murmur heard.    No friction rub. No gallop.   Pulmonary:      Effort: Pulmonary effort is normal. No respiratory distress.      Breath sounds: Normal breath sounds. No wheezing, rhonchi or rales.   Abdominal:      General: Bowel sounds are normal. There is no distension.      Palpations: Abdomen is soft.      Tenderness: There is no abdominal tenderness.   Musculoskeletal:         General: No swelling.      Cervical back: Normal range of motion and neck supple. No tenderness.   Lymphadenopathy:      Cervical: No cervical adenopathy.   Skin:     General: Skin is warm.      Findings: No erythema or rash.   Neurological:      Mental Status: She is alert and oriented to person, place, and time.   Psychiatric:         Mood and Affect: Mood normal.         Behavior: Behavior normal.          Assessment and Plan     1. History of insulin resistance  2. Class 1 obesity    History of insulin resistance  Comments:  pt restarted ozempic; tolerating thus  far    Chronic/recent flare/increased appetite-titrating ozempic back up  Class 1 obesity  Comments:  on glp-1  continue lifestyle measures  Goal: resume exercise 2x/week    Nausea  Comments:  usually 2 days after injection; have dw pt ways to prevent        DW pt next goals      Pt to let me know how is doing with mychart update 2 weeks  She will schedule online in 2 mos    No follow-ups on file.

## 2023-07-05 DIAGNOSIS — E66.9 CLASS 1 OBESITY: ICD-10-CM

## 2023-07-05 DIAGNOSIS — Z86.39 HISTORY OF INSULIN RESISTANCE: ICD-10-CM

## 2023-07-05 DIAGNOSIS — Z83.3 FAMILY HISTORY OF DIABETES MELLITUS (DM): ICD-10-CM

## 2023-07-05 RX ORDER — SEMAGLUTIDE 0.68 MG/ML
INJECTION, SOLUTION SUBCUTANEOUS
Qty: 3 ML | Refills: 0 | Status: SHIPPED | OUTPATIENT
Start: 2023-07-05

## 2023-07-05 NOTE — TELEPHONE ENCOUNTER
Refill Routing Note   Medication(s) are not appropriate for processing by [facility] for the following reason(s):      Patient not seen by PCP within 15 months  Patient seen in ED/Hospital since LOV with PCP  Required labs outdated  Responsible provider unclear    [facility] action(s):  Defer None identified            Appointments  past 12m or future 3m with PCP    Date Provider   Last Visit   5/30/2023 Adelina Goode MD   Next Visit   Visit date not found Adelina Goode MD   ED visits in past 90 days: 0        Note composed:11:05 AM 07/05/2023

## 2023-08-02 ENCOUNTER — PATIENT MESSAGE (OUTPATIENT)
Dept: PRIMARY CARE CLINIC | Facility: CLINIC | Age: 28
End: 2023-08-02
Payer: COMMERCIAL

## 2023-09-07 ENCOUNTER — OFFICE VISIT (OUTPATIENT)
Dept: ALLERGY | Facility: CLINIC | Age: 28
End: 2023-09-07
Payer: COMMERCIAL

## 2023-09-07 ENCOUNTER — LAB VISIT (OUTPATIENT)
Dept: LAB | Facility: HOSPITAL | Age: 28
End: 2023-09-07
Attending: FAMILY MEDICINE
Payer: COMMERCIAL

## 2023-09-07 VITALS
DIASTOLIC BLOOD PRESSURE: 65 MMHG | TEMPERATURE: 98 F | HEART RATE: 92 BPM | WEIGHT: 171.94 LBS | SYSTOLIC BLOOD PRESSURE: 99 MMHG | BODY MASS INDEX: 31.64 KG/M2 | HEIGHT: 62 IN

## 2023-09-07 DIAGNOSIS — Z87.2 HISTORY OF COLD-INDUCED URTICARIA: ICD-10-CM

## 2023-09-07 DIAGNOSIS — L50.1 CHRONIC IDIOPATHIC URTICARIA: ICD-10-CM

## 2023-09-07 DIAGNOSIS — T78.1XXA ADVERSE FOOD REACTION, INITIAL ENCOUNTER: ICD-10-CM

## 2023-09-07 DIAGNOSIS — L50.1 CHRONIC IDIOPATHIC URTICARIA: Primary | ICD-10-CM

## 2023-09-07 DIAGNOSIS — J31.0 CHRONIC RHINITIS: ICD-10-CM

## 2023-09-07 PROCEDURE — 86343 LEUKOCYTE HISTAMINE RELEASE: CPT | Performed by: STUDENT IN AN ORGANIZED HEALTH CARE EDUCATION/TRAINING PROGRAM

## 2023-09-07 PROCEDURE — 99999 PR PBB SHADOW E&M-EST. PATIENT-LVL III: CPT | Mod: PBBFAC,,, | Performed by: STUDENT IN AN ORGANIZED HEALTH CARE EDUCATION/TRAINING PROGRAM

## 2023-09-07 PROCEDURE — 99204 PR OFFICE/OUTPT VISIT, NEW, LEVL IV, 45-59 MIN: ICD-10-PCS | Mod: S$GLB,,, | Performed by: STUDENT IN AN ORGANIZED HEALTH CARE EDUCATION/TRAINING PROGRAM

## 2023-09-07 PROCEDURE — 1159F PR MEDICATION LIST DOCUMENTED IN MEDICAL RECORD: ICD-10-PCS | Mod: CPTII,S$GLB,, | Performed by: STUDENT IN AN ORGANIZED HEALTH CARE EDUCATION/TRAINING PROGRAM

## 2023-09-07 PROCEDURE — 1159F MED LIST DOCD IN RCRD: CPT | Mod: CPTII,S$GLB,, | Performed by: STUDENT IN AN ORGANIZED HEALTH CARE EDUCATION/TRAINING PROGRAM

## 2023-09-07 PROCEDURE — 86038 ANTINUCLEAR ANTIBODIES: CPT | Performed by: STUDENT IN AN ORGANIZED HEALTH CARE EDUCATION/TRAINING PROGRAM

## 2023-09-07 PROCEDURE — 88184 FLOWCYTOMETRY/ TC 1 MARKER: CPT | Performed by: STUDENT IN AN ORGANIZED HEALTH CARE EDUCATION/TRAINING PROGRAM

## 2023-09-07 PROCEDURE — 99204 OFFICE O/P NEW MOD 45 MIN: CPT | Mod: S$GLB,,, | Performed by: STUDENT IN AN ORGANIZED HEALTH CARE EDUCATION/TRAINING PROGRAM

## 2023-09-07 PROCEDURE — 99999 PR PBB SHADOW E&M-EST. PATIENT-LVL III: ICD-10-PCS | Mod: PBBFAC,,, | Performed by: STUDENT IN AN ORGANIZED HEALTH CARE EDUCATION/TRAINING PROGRAM

## 2023-09-07 PROCEDURE — 86003 ALLG SPEC IGE CRUDE XTRC EA: CPT | Performed by: STUDENT IN AN ORGANIZED HEALTH CARE EDUCATION/TRAINING PROGRAM

## 2023-09-07 PROCEDURE — 3008F BODY MASS INDEX DOCD: CPT | Mod: CPTII,S$GLB,, | Performed by: STUDENT IN AN ORGANIZED HEALTH CARE EDUCATION/TRAINING PROGRAM

## 2023-09-07 PROCEDURE — 36415 COLL VENOUS BLD VENIPUNCTURE: CPT | Performed by: STUDENT IN AN ORGANIZED HEALTH CARE EDUCATION/TRAINING PROGRAM

## 2023-09-07 PROCEDURE — 3074F SYST BP LT 130 MM HG: CPT | Mod: CPTII,S$GLB,, | Performed by: STUDENT IN AN ORGANIZED HEALTH CARE EDUCATION/TRAINING PROGRAM

## 2023-09-07 PROCEDURE — 3078F DIAST BP <80 MM HG: CPT | Mod: CPTII,S$GLB,, | Performed by: STUDENT IN AN ORGANIZED HEALTH CARE EDUCATION/TRAINING PROGRAM

## 2023-09-07 PROCEDURE — 3078F PR MOST RECENT DIASTOLIC BLOOD PRESSURE < 80 MM HG: ICD-10-PCS | Mod: CPTII,S$GLB,, | Performed by: STUDENT IN AN ORGANIZED HEALTH CARE EDUCATION/TRAINING PROGRAM

## 2023-09-07 PROCEDURE — 3074F PR MOST RECENT SYSTOLIC BLOOD PRESSURE < 130 MM HG: ICD-10-PCS | Mod: CPTII,S$GLB,, | Performed by: STUDENT IN AN ORGANIZED HEALTH CARE EDUCATION/TRAINING PROGRAM

## 2023-09-07 PROCEDURE — 86003 ALLG SPEC IGE CRUDE XTRC EA: CPT | Mod: 59 | Performed by: STUDENT IN AN ORGANIZED HEALTH CARE EDUCATION/TRAINING PROGRAM

## 2023-09-07 PROCEDURE — 3008F PR BODY MASS INDEX (BMI) DOCUMENTED: ICD-10-PCS | Mod: CPTII,S$GLB,, | Performed by: STUDENT IN AN ORGANIZED HEALTH CARE EDUCATION/TRAINING PROGRAM

## 2023-09-07 PROCEDURE — 82785 ASSAY OF IGE: CPT | Performed by: STUDENT IN AN ORGANIZED HEALTH CARE EDUCATION/TRAINING PROGRAM

## 2023-09-07 PROCEDURE — 83520 IMMUNOASSAY QUANT NOS NONAB: CPT | Performed by: STUDENT IN AN ORGANIZED HEALTH CARE EDUCATION/TRAINING PROGRAM

## 2023-09-07 RX ORDER — EPINEPHRINE 0.3 MG/.3ML
1 INJECTION SUBCUTANEOUS ONCE
Qty: 0.3 ML | Refills: 0 | Status: SHIPPED | OUTPATIENT
Start: 2023-09-07 | End: 2023-09-07

## 2023-09-07 RX ORDER — AMOXICILLIN AND CLAVULANATE POTASSIUM 875; 125 MG/1; MG/1
1 TABLET, FILM COATED ORAL EVERY 12 HOURS
COMMUNITY
Start: 2023-08-19

## 2023-09-07 RX ORDER — LORATADINE 10 MG/1
10 TABLET ORAL
COMMUNITY

## 2023-09-07 NOTE — ASSESSMENT & PLAN NOTE
- Not major complaint but wanting environmental rule-out   - Ordered Serum Aeroallergen IgE unable to SPT due to chronic hives and current Claritin use   - Will continue to monitor and reassess

## 2023-09-07 NOTE — PROGRESS NOTES
Allergy and Immunology  New Patient Clinic Note    Date: 2023  Chief Complaint   Patient presents with    Rash     Hives      Referred by: Self, Aaareferral  No address on file    History  Amanda Mcbride is a 28 y.o. female being seen as a New Patient today.    Chronic Rhinitis   - Onset: a few years   - Symptoms: nasal pruritis and congestion   - Suspected triggers include: possible environmental - possible cologne but does not trigger rhinorrhea     - Vasomotor in the differential but not leading   - Medications: Oral antihistamines - for urticaria   - Nasal symptoms are less of an issue per patient     Chronic Urticaria/ Cold Induced urticaria   - Onset: childhood   - Symptoms: urticaria when changing in cold room, swimming, and night time when AC down   - Suspected triggers include: cold with   - Duration:< 24 hours   - Skin changes: No skin changes - no bruising or discoloration   - Medications: Claritin once daily typically worked but flare after recent infection   - Hx of Autoimmune Disease/Malignancy: No known personal but mother may have Hashimoto's   - Patient has not been in snow or cold water recently - educated on avoidance   - Patient does not take NSAIDs     Asthma   - No hx of asthma     CRSwNP  - No hx of CRSwNP     Eczema   - No hx of eczema     Food Allergy  - Banana: years ago - throat itching and upset stomach    - patient has avoided since without accidental exposure    - no issues with latex     Drug Allergy  - No hx of drug allergy     Recurrent Infections  - No hx of recurrent infections   - Recent sinus infection - possible viral URI with flare of chronic urticaria   - Not a pattern of infections though     Venom Allergy  - No hx of venom allergy     Allergies, PMH, PSH, Social, and Family History were reviewed.    Review of patient's allergies indicates:  No Known Allergies   No past medical history on file.  Past Surgical History:   Procedure Laterality Date     SECTION        Social History     Social History Narrative    Not on file     S/he reports that she has never smoked. She has never used smokeless tobacco. She reports current alcohol use. No history on file for drug use.    Current Outpatient Medications on File Prior to Visit   Medication Sig Dispense Refill    amoxicillin-clavulanate 875-125mg (AUGMENTIN) 875-125 mg per tablet Take 1 tablet by mouth every 12 (twelve) hours.      JUNEL FE 1.5/30, 28, 1.5 mg-30 mcg (21)/75 mg (7) tablet Take 1 tablet by mouth once daily. 3 tablet 2    loratadine (CLARITIN) 10 mg tablet Take 10 mg by mouth.      OZEMPIC 0.25 mg or 0.5 mg (2 mg/3 mL) pen injector INJECT 0.5MG INTO THE SKIN EVERY 7 DAYS (Patient not taking: Reported on 9/7/2023) 3 mL 0     No current facility-administered medications on file prior to visit.       Physical Examination  Vitals:    09/07/23 1344   BP: 99/65   Pulse: 92   Temp: 98.4 °F (36.9 °C)     GENERAL:  female in no apparent distress and well developed and well nourished  HEAD:  Normocephalic, without obvious abnormality, atraumatic  EYES: sclera anicteric, conjunctiva normochromic  EARS: normal TM's and external ear canals both ears  NOSE: without erythema or discharge, clear discharge, turbinates normal    OROPHARYNX: moist mucous membranes without erythema, exudates or petechiae  LYMPH NODES: normal, supple, no lymphadenopathy  LUNGS: clear to auscultation, no wheezes, rales or rhonchi, symmetric air entry.  HEART: normal rate, regular rhythm, normal S1, S2, no murmurs, rubs, clicks or gallops.  ABDOMEN: soft, nontender, nondistended, no masses or organomegaly.  MUSCULOSKELETAL: no gross joint deformity or swelling.  NEURO: alert, oriented, normal speech, no focal findings or movement disorder noted.  SKIN: normal coloration and turgor, no rashes, no suspicious skin lesions noted. No urticaria on presentation.   Below photos are the findings after ice cube test     Allergy Skin Tests  09/07/2023: Positive  ice cube test for cold induced urticaria             Assessment/Plan:   Problem List Items Addressed This Visit          ENT    Chronic rhinitis    Current Assessment & Plan     - Not major complaint but wanting environmental rule-out   - Ordered Serum Aeroallergen IgE unable to SPT due to chronic hives and current Claritin use   - Will continue to monitor and reassess          Relevant Orders    Aspergillus fumagatus IgE    Bermuda grass IgE    Cat epithelium IgE    Cladosporium IgE    Cockroach, American IgE    [place], bald IgE    D. farinae IgE    D. pteronyssinus IgE    Dog dander IgE    Plantain, English IgE    Eulogio grass IgE    Marsh elder, rough IgE    Mugwort IgE    Nettle IgE    Oak, white IgE    Penicillium IgE    Ragweed, short, common IgE    Matt IgE    Allergen, Cocklebur    Allergen, Elm [place]    Allergen, Meadow Grass ([employer] [place] Blue)    Allergen, Mucor Racemosus    Allergen, Pecan Tree IgE    Allergen, White [place]    Allergen-Alternaria Alternata    Allergen-[place]    Allergen-Common Pigweed    Allergen-Silver Birch    Feather Panel #2    RAST Allergen for [place]    RAST Allergen Maple ([place])    RAST Allergen [place]    RAST Allergen, Lamb's Quarters    RAST Allergen, Sheep Thor[place])    IgE    Tryptase    ALLERGEN BANANA    CU (Chronic Urticaria) Index Panel    Anti-IgE Receptor Antibody    EVER Screen w/Reflex       Derm    Chronic idiopathic urticaria - Primary    Overview     - 09/07/2023: Positive ice cube test for cold induced urticaria   - Since childhood with recent flare prior to establishing care (08/2023)           Current Assessment & Plan     - Hx since childhood with recent worsening after infection   - Triggers: cold rooms and cold water   - No hx of anaphylaxis   - Positive ice cube test at this appointment - confirmed diagnosis   - Recommended increasing oral antihistamine from daily to BID at this time  - Patient to get medication [facility]'s   - Discussed  risk and benefits of medications - avoid benadryl   - Ordered EpiPen for extreme cold exposure to do risk of progression to anaphylaxis  - Educated on possible progression to anaphylaxis with extreme temps  - Notify MD prior to traveling to cold climates   - ED precautions discussed at length   - Will continue to monitor and reassess          Relevant Orders    Aspergillus fumagatus IgE    Bermuda grass IgE    Cat epithelium IgE    Cladosporium IgE    Cockroach, American IgE    [place], bald IgE    D. farinae IgE    D. pteronyssinus IgE    Dog dander IgE    Plantain, English IgE    Eulogio grass IgE    Marsh elder, rough IgE    Mugwort IgE    Nettle IgE    Oak, white IgE    Penicillium IgE    Ragweed, short, common IgE    Matt IgE    Allergen, Cocklebur    Allergen, Elm [place]    Allergen, Meadow Grass ([employer] Blue)    Allergen, Mucor Racemosus    Allergen, Pecan Tree IgE    Allergen, White Devendra    Allergen-Alternaria Alternata    Allergen-[place]    Allergen-Common Pigweed    Allergen-Silver Birch    Feather Panel #2    RAST Allergen for [place]    RAST Allergen Maple ([place])    RAST Allergen [place]    RAST Allergen, Lamb's Quarters    RAST Allergen, Sheep [place]ellow Dock)    IgE    Tryptase    ALLERGEN BANANA    CU (Chronic Urticaria) Index Panel    Anti-IgE Receptor Antibody    EVER Screen w/Reflex    History of cold-induced urticaria    Overview     - 09/07/2023: Positive ice cube test for cold induced urticaria   - Since childhood with recent flare prior to establishing care (08/2023)            Other    Adverse food reaction    Overview     Banana - Lower suspicion  - Throat itching and upset stomach in childhood  - No exposure since so will evaluate with goal of adding to diet if possible           Follow up:  Follow up in about 2 months (around 11/7/2023).    [facility]  Allergy and Immunology

## 2023-09-07 NOTE — ASSESSMENT & PLAN NOTE
- Hx since childhood with recent worsening after infection   - Triggers: cold rooms and cold water   - No hx of anaphylaxis   - Positive ice cube test at this appointment - confirmed diagnosis   - Recommended increasing oral antihistamine from daily to BID at this time  - Patient to get medication from [facility]'s   - Discussed risk and benefits of medications - avoid benadryl   - Ordered EpiPen for extreme cold exposure to do risk of progression to anaphylaxis  - Educated on possible progression to anaphylaxis with extreme temps  - Notify MD prior to traveling to cold climates   - ED precautions discussed at length   - Will continue to monitor and reassess

## 2023-09-08 LAB
ANA SER QL IF: NORMAL
IGE SERPL-ACNC: 107 IU/ML (ref 0–100)

## 2023-09-11 ENCOUNTER — PATIENT MESSAGE (OUTPATIENT)
Dept: ALLERGY | Facility: CLINIC | Age: 28
End: 2023-09-11
Payer: COMMERCIAL

## 2023-09-11 LAB
A ALTERNATA IGE QN: <0.1 KU/L
A FUMIGATUS IGE QN: <0.1 KU/L
ALLERGEN BOXELDER MAPLE TREE IGE: 0.51 KU/L
ALLERGEN MAPLE (BOX ELDER) CLASS: ABNORMAL
ALLERGEN MULBERRY CLASS: ABNORMAL
ALLERGEN MULBERRY TREE IGE: 0.22 KU/L
ALLERGEN PIGWEED IGE: 0.38 KU/L
ALLERGEN WHITE ASH TREE IGE: 0.48 KU/L
AMER SYCAMORE IGE QN: 0.69 KU/L
BALD CYPRESS IGE QN: 0.24 KU/L
BANANA IGE QN: 0.39 KU/L
BERMUDA GRASS IGE QN: 4.07 KU/L
C HERBARUM IGE QN: <0.1 KU/L
CAT DANDER IGE QN: 0.28 KU/L
CEDAR IGE QN: <0.1 KU/L
COCKLEBUR IGE QN: 0.59 KU/L
COMMON PIGWEED CLASS: ABNORMAL
COMMON RAGWEED IGE QN: 1.05 KU/L
D FARINAE IGE QN: 9.73 KU/L
D PTERONYSS IGE QN: 15 KU/L
DEPRECATED A ALTERNATA IGE RAST QL: NORMAL
DEPRECATED A FUMIGATUS IGE RAST QL: NORMAL
DEPRECATED BALD CYPRESS IGE RAST QL: ABNORMAL
DEPRECATED BANANA IGE RAST QL: ABNORMAL
DEPRECATED BERMUDA GRASS IGE RAST QL: ABNORMAL
DEPRECATED C HERBARUM IGE RAST QL: NORMAL
DEPRECATED CAT DANDER IGE RAST QL: ABNORMAL
DEPRECATED CEDAR IGE RAST QL: NORMAL
DEPRECATED COCKLEBUR IGE RAST QL: ABNORMAL
DEPRECATED COMMON RAGWEED IGE RAST QL: ABNORMAL
DEPRECATED D FARINAE IGE RAST QL: ABNORMAL
DEPRECATED D PTERONYSS IGE RAST QL: ABNORMAL
DEPRECATED DOG DANDER IGE RAST QL: ABNORMAL
DEPRECATED ELDER IGE RAST QL: ABNORMAL
DEPRECATED ENGL PLANTAIN IGE RAST QL: ABNORMAL
DEPRECATED GOOSEFOOT IGE RAST QL: ABNORMAL
DEPRECATED JOHNSON GRASS IGE RAST QL: ABNORMAL
DEPRECATED KENT BLUE GRASS IGE RAST QL: ABNORMAL
DEPRECATED M RACEMOSUS IGE RAST QL: ABNORMAL
DEPRECATED MUGWORT IGE RAST QL: ABNORMAL
DEPRECATED NETTLE IGE RAST QL: ABNORMAL
DEPRECATED P NOTATUM IGE RAST QL: NORMAL
DEPRECATED PECAN/HICK TREE IGE RAST QL: ABNORMAL
DEPRECATED ROACH IGE RAST QL: ABNORMAL
DEPRECATED SHEEP SORREL IGE RAST QL: ABNORMAL
DEPRECATED SILVER BIRCH IGE RAST QL: ABNORMAL
DEPRECATED TIMOTHY IGE RAST QL: ABNORMAL
DEPRECATED WHITE OAK IGE RAST QL: ABNORMAL
DOG DANDER IGE QN: 0.74 KU/L
ELDER IGE QN: 0.53 KU/L
ELM CEDAR CLASS: ABNORMAL
ELM CEDAR, IGE: 0.49 KU/L
ENGL PLANTAIN IGE QN: 0.45 KU/L
FEATHER PANEL #2: <0.1 KU/L
GOOSEFOOT IGE QN: 0.29 KU/L
JOHNSON GRASS IGE QN: 5.4 KU/L
KENT BLUE GRASS IGE QN: 11.3 KU/L
M RACEMOSUS IGE QN: 0.12 KU/L
MUGWORT IGE QN: 0.3 KU/L
NETTLE IGE QN: 0.35 KU/L
P NOTATUM IGE QN: <0.1 KU/L
PECAN/HICK TREE IGE QN: 0.61 KU/L
ROACH IGE QN: 0.25 KU/L
SHEEP SORREL IGE QN: 0.45 KU/L
SILVER BIRCH IGE QN: 0.3 KU/L
TIMOTHY IGE QN: 4.65 KU/L
TRYPTASE LEVEL: 3.8 NG/ML
WHITE ASH CLASS: ABNORMAL
WHITE OAK IGE QN: 0.51 KU/L

## 2023-09-20 LAB
CU INDEX: <1.7
CU, ANTI-THYROGLOBULIN IGG: NORMAL
CU, ANTI-THYROID PEROXIDASE IGG: <10 IU/ML
CU, TSH (THYROTROPIN): 3.23 UIU/ML (ref 0.4–4)

## 2023-09-22 ENCOUNTER — PATIENT MESSAGE (OUTPATIENT)
Dept: ALLERGY | Facility: CLINIC | Age: 28
End: 2023-09-22
Payer: COMMERCIAL

## 2023-10-17 ENCOUNTER — PATIENT OUTREACH (OUTPATIENT)
Dept: ADMINISTRATIVE | Facility: HOSPITAL | Age: 28
End: 2023-10-17
Payer: COMMERCIAL

## 2023-10-17 NOTE — PROGRESS NOTES
Specialty Providers Report: Pt has been seeing [facility] MD, Teams updated.     no change

## 2023-10-18 LAB
BASOPHILS %, CD203C: 4.2 % (ref 0–12)
IGE RECEPTOR AB INTERPRETATION:: NORMAL

## 2024-03-14 ENCOUNTER — PATIENT MESSAGE (OUTPATIENT)
Dept: ADMINISTRATIVE | Facility: OTHER | Age: 29
End: 2024-03-14
Payer: COMMERCIAL

## 2024-10-09 NOTE — PROGRESS NOTES
Patient ID: [id number]     Chief Complaint: Follow-up    HPI:     Amanda Mcbride is a 29 y.o. female with diagnosis of IR, Hypothyroidism, Obesity. Patient referred by PCP. Patient's PCP is Vangie Huynh NP. Patient seen in clinic today for Obesity. Patient last seen in clinic on 2023 pr Dr. Goode, notes reviewed.    on 09/10/2024. Has 2 children, oldest is 6 y/o.   Breastfeeding: completed    Weight history   Started weight watchers after birth of first child, did lose significant amount of weight but stopped after a year due to a [place] trip. Started on Ozempic per Dr. Goode in , had weight loss but wanted to conceive 2nd child so stopped medication. Patient did have delivery of second child on 09/10/2024 by . Patient states she is no longer breast feeding and is starting OCP this coming .     Previous Obesity Treatment:   Ozempic - successful weight loss  Weight watchers - successful weight loss    BMI:  35.8 (10/10/2024)  31.4 (2023)  29.8 (02/10/2023)    Weight:  Wt Readings from Last 6 Encounters:   10/10/24 88.9 kg (195 lb 15.8 oz)   24 94.3 kg (208 lb)   24 100.7 kg (222 lb)   24 100.7 kg (222 lb)   24 100.2 kg (221 lb)   24 98.4 kg (217 lb)     Ideal/Adjusted Body Weight:  Ideal: 110 lbs  Adjusted: 144 lbs    Waist Circumference:  39.5 in    Percent Body Fat:   53.4%  https://www.calculator.net/body-fat-calculator.html    Neck Circumference:   14 in    InBody: none noted    Nutrition history   Breakfast: chicken biscuit  Lunch: turkey sandwich   Dinner: chicken nuggets  Snack: baked cheetos  Water: 40 oz/day  Sugar Sweetened beverages: zero sugar soda   Etoh: denies  Cravings: denies  Dietician: denies    Current physical activity:   None at this time,  on 09/10/2024  Barriers: recent     Stressors/Mental Health   New baby at home   Over the last two weeks how often have you been bothered by little interest or  pleasure in doing things: 0  Over the last two weeks how often have you been bothered by feeling down, depressed or hopeless: 0  PHQ-2 Total Score: 0    Sleep habits   Hasn't been regular since delivery but does sleep ok    CVD screening  The ASCVD Risk score (Ronal BARRON, et al., 2019) failed to calculate for the following reasons:    The 2019 ASCVD risk score is only valid for ages 40 to 79    LMP:  on 09/10/2024    Contraception: starting OCP on 10/13/2024      I spent 10 minutes counseling patient on diet, physical activity and reviewing labs.     Review of patient's allergies indicates:  No Known Allergies  Current Outpatient Medications   Medication Instructions    EPINEPHrine (EPIPEN 2-ASHLEY) 0.3 mg, Intramuscular, Once    ibuprofen (ADVIL,MOTRIN) 600 mg, 3 times daily PRN    JUNEL FE 1.5/, 28, 1.5 mg-30 mcg (21)/75 mg (7) tablet 1 tablet, Oral, Daily    levothyroxine (SYNTHROID) 50 mcg, Oral, Before breakfast    levothyroxine (SYNTHROID) 50 mcg, Oral, Before breakfast    loratadine (CLARITIN) 10 mg    OZEMPIC 0.25 mg, Subcutaneous, Every 7 days     Past Surgical History:   Procedure Laterality Date     SECTION       family history is not on file.    Social History     Socioeconomic History    Marital status:    Tobacco Use    Smoking status: Never    Smokeless tobacco: Never   Substance and Sexual Activity    Alcohol use: Yes     Comment: rarely    Drug use: Never    Sexual activity: Yes     Partners: Male     Birth control/protection: None     Social Drivers of Health     Financial Resource Strain: Patient Declined (9/10/2024)    Received from [facility]    Overall Financial Resource Strain (CARDIA)     Difficulty of Paying Living Expenses: Patient declined   Food Insecurity: Patient Declined (9/10/2024)    Received from [facility]    Hunger Vital Sign     Worried About Running Out of Food in the Last Year: Patient declined     Ran Out of Food in the Last Year: Patient  "declined   Transportation Needs: Patient Declined (9/10/2024)    Received from [facility]    PRAPARE - Transportation     Lack of Transportation (Medical): Patient declined     Lack of Transportation (Non-Medical): Patient declined   Physical Activity: Patient Declined (9/10/2024)    Received from [facility]    Exercise Vital Sign     Days of Exercise per Week: Patient declined     Minutes of Exercise per Session: Patient declined   Stress: Patient Declined (9/10/2024)    Received from [facility]    [employer] [place] [employer] - Occupational Stress Questionnaire     Feeling of Stress : Patient declined   Housing Stability: Patient Declined (9/10/2024)    Received from [facility]    Housing Stability Vital Sign     Unable to Pay for Housing in the Last Year: Patient declined     Number of Times Moved in the Last Year: 0     Homeless in the Last Year: Patient declined       Subjective:     Review of Systems   Constitutional: Negative.    HENT: Negative.     Eyes: Negative.    Respiratory: Negative.     Cardiovascular: Negative.    Gastrointestinal: Negative.    Endocrine: Negative.    Genitourinary: Negative.    Musculoskeletal: Negative.    Skin: Negative.    Allergic/Immunologic: Negative.    Neurological: Negative.    Hematological: Negative.    Psychiatric/Behavioral: Negative.       Objective:     Visit Vitals  /72   Pulse 93   Temp 97.4 °F (36.3 °C) (Tympanic)   Resp 18   Ht 5' 2" (1.575 m)   Wt 88.9 kg (195 lb 15.8 oz)   LMP 12/11/2023 (Exact Date)   SpO2 98%   Breastfeeding Unknown   BMI 35.85 kg/m²       Physical Exam  Vitals reviewed.   Constitutional:       Appearance: Normal appearance. She is obese.   HENT:      Head: Normocephalic and atraumatic.   Eyes:      Extraocular Movements: Extraocular movements intact.      Conjunctiva/sclera: Conjunctivae normal.      Pupils: Pupils are equal, round, and reactive to light.   Cardiovascular:      Rate and Rhythm: Normal " "rate and regular rhythm.      Heart sounds: Normal heart sounds.   Pulmonary:      Effort: Pulmonary effort is normal.      Breath sounds: Normal breath sounds.   Abdominal:      General: Bowel sounds are normal. There is distension.      Palpations: Abdomen is soft.   Musculoskeletal:         General: Normal range of motion.      Cervical back: Normal range of motion.   Skin:     General: Skin is warm and dry.   Neurological:      Mental Status: She is alert and oriented to person, place, and time.   Psychiatric:         Mood and Affect: Mood normal.         Behavior: Behavior normal.       Labs Reviewed:     Hematology:  Lab Results   Component Value Date    WBC 9.58 06/28/2024    HGB 11.4 (L) 06/28/2024    HCT 35.1 (L) 06/28/2024     06/28/2024     Chemistry:  Lab Results   Component Value Date    BUN 8 05/17/2024    CREATININE 0.6 05/17/2024    EGFRNORACEVR >60.0 05/17/2024    AST 10 05/17/2024    ALT 9 (L) 05/17/2024      Lab Results   Component Value Date    HGBA1C 5.3 08/20/2024      Lipid Panel:  No results found for: "CHOL", "HDL", "LDL", "TRIG", "TOTALCHOLEST"   Thyroid:  Lab Results   Component Value Date    TSH 1.135 08/20/2024      Urine:  Lab Results   Component Value Date    APPEARANCEUA Clear 07/15/2024    PROTEINUA Negative 07/15/2024    LEUKOCYTESUR Negative 07/15/2024    CREATRANDUR 72.4 05/17/2024    PROTEINURINE <7 05/17/2024        Assessment:       ICD-10-CM ICD-9-CM   1. History of insulin resistance  Z86.39 V12.29   2. Family history of diabetes mellitus (DM)  Z83.3 V18.0   3. Hypothyroidism, unspecified type  E03.9 244.9   4. Class 2 obesity due to excess calories without serious comorbidity with body mass index (BMI) of 35.0 to 35.9 in adult  E66.812 278.00    E66.09 V85.35    Z68.35         Plan:     1. History of insulin resistance (Primary)  GLP-1 improves insuling resistance of adipose tissue in obese humans.   Start semaglutide 0.25 mg SC weekly  Weight loss and exercise can " "help reverse insulin resistance.   Aerobic activity for 20-30 minutes 5 days a week.  Recommend Low carbohydrate or Mediterranean diet.  - semaglutide (OZEMPIC) 0.25 mg or 0.5 mg (2 mg/3 mL) pen injector; Inject 0.25 mg into the skin every 7 days.  Dispense: 3 mL; Refill: 0    2. Family history of diabetes mellitus (DM)  - semaglutide (OZEMPIC) 0.25 mg or 0.5 mg (2 mg/3 mL) pen injector; Inject 0.25 mg into the skin every 7 days.  Dispense: 3 mL; Refill: 0    3. Hypothyroidism, unspecified type  TSH level: 1.1  Continue Levothyroxine 50mcg daily  Has follow up appointment with PCP on 10/22    4. Class 2 obesity due to excess calories without serious comorbidity with body mass index (BMI) of 35.0 to 35.9 in adult  Body mass index is 35.85 kg/m².  Wt Readings from Last 1 Encounters:   10/10/24 88.9 kg (195 lb 15.8 oz)   Waist Circumference: 39.5 in  TSH   Date Value Ref Range Status   2024 1.135 0.400 - 4.000 uIU/mL Final     No results found for: "ECMETSKA00YI"     A modest (5-10%) weight loss improves health and quality of life.     Goal: 160 lbs  Short-term: lose 10 lbs in 12 weeks  Long-term: lose 35 lbs in 1 year  *Keep in mind that, on average, you may lose 1-2 lbs per week*    Willingness to change: 8/10    Patient qualifies for anti-obesity medications due to BMI 35.85. Anti-obesity medications are an adjunct to nutritional, physical activity, and behavioral therapies. Medication alone cannot treat obesity. I recommend starting medication to significantly improve patient's risk factor/s.   Medication:   Semaglutide  Dosin.25 mg SC weekly  Discussed at length potential pharmacologic options. She desires consideration of GLP-1. Risks/benefits/common side effects discussed patient including nausea and pain at injection site. She is aware she should not get pregnant while taking and medication not approved to take while breastfeeding. Patient must be off x 8 weeks prior to attempting pregnancy. No " personal/family hx of MEN 2 or medullary thyroid cancer. No hx of thyroid nodules or biliary disease/gallstones. Also reviewed with patient gastroparesis and potential for mental health changes. Notify provider immediately if any significant side effects or issues. Discussed with patient that with substantial or rapid weight loss, gallstones could develop. Also discussed with patient GLP-1 MOA and common side effects. Instructed patient how to use with demo pen; patient practiced in office. Patient advised if approved will get medication education handout from pharmacy.  Recommend InBody due to possible muscle loss.   Birth Control: OCP  Monitor for pancreatitis   Strength training at least 2 days a week to help prevent decrease in muscle mass.   - semaglutide (OZEMPIC) 0.25 mg or 0.5 mg (2 mg/3 mL) pen injector; Inject 0.25 mg into the skin every 7 days.  Dispense: 3 mL; Refill: 0    Nutrition: 1,650 calories per day.      Protein: goal is a minimum of 80g/day. Protein has a slower rate of digestion = greater satiety (fullness).        Aim for <25g of added sugar per day.      Recommend to increase fiber intake.   www.fullplateliving.org      Eat Fit Shopping List.      Prepare meals in advance.      Eat breakfast within 2 hours of waking up.       Track what you eat. Research shows that people who track their food intake are more successful with weight management. This creates awareness of what you are eating/drinking and can help you see where to make changes.       Get to know your plate.  www.myplate.gov      Stay hydrated.     Activity: 2-3 days of strength training. This can be body weight, light weight or elastic bands exercises. [employer] and "[employer]" are great sources for free workout plans/videos.     Start slowly with an activity you enjoy and make it a habit.     Ask a family member or friend to get active with you.     Aim for 5,000 - 10,000 steps per day     Schedule time to make physical activity a  part of your daily routine.     Exercise prescription: patient still on pelvic rest from recent delivery    Sleep: 7-8 hours of sleep a night    *Recognize your progress and remember that each day is a new day*  *Stay on track, even when you feel like you're not making progress*  *Sit Less, Stand Often, Move More*  *You don't have to be perfect; be persistent*    Avoiding Weight Regain:  - continued with modified food intake (changed eating habits)  - eat breakfast every day  - weigh yourself at least once a week  - watch less than 10 hours of TV per week  - continue with increased physical activity  - physical activity, on average, about 1 hour per day      Follow up in about 6 weeks (around 11/21/2024) for Labs, Virtual Visit. In addition to their scheduled follow up, the patient has also been instructed to follow up on as needed basis.     Shara Mcgee, SHAYYP

## 2024-10-10 ENCOUNTER — PATIENT MESSAGE (OUTPATIENT)
Dept: PRIMARY CARE CLINIC | Facility: CLINIC | Age: 29
End: 2024-10-10

## 2024-10-10 ENCOUNTER — OFFICE VISIT (OUTPATIENT)
Dept: PRIMARY CARE CLINIC | Facility: CLINIC | Age: 29
End: 2024-10-10
Payer: COMMERCIAL

## 2024-10-10 VITALS
OXYGEN SATURATION: 98 % | SYSTOLIC BLOOD PRESSURE: 112 MMHG | TEMPERATURE: 97 F | HEART RATE: 93 BPM | HEIGHT: 62 IN | RESPIRATION RATE: 18 BRPM | DIASTOLIC BLOOD PRESSURE: 72 MMHG | WEIGHT: 196 LBS | BODY MASS INDEX: 36.07 KG/M2

## 2024-10-10 DIAGNOSIS — E03.9 HYPOTHYROIDISM, UNSPECIFIED TYPE: ICD-10-CM

## 2024-10-10 DIAGNOSIS — E66.812 CLASS 2 OBESITY DUE TO EXCESS CALORIES WITHOUT SERIOUS COMORBIDITY WITH BODY MASS INDEX (BMI) OF 35.0 TO 35.9 IN ADULT: Chronic | ICD-10-CM

## 2024-10-10 DIAGNOSIS — Z83.3 FAMILY HISTORY OF DIABETES MELLITUS (DM): ICD-10-CM

## 2024-10-10 DIAGNOSIS — Z86.39 HISTORY OF INSULIN RESISTANCE: Primary | ICD-10-CM

## 2024-10-10 DIAGNOSIS — E66.09 CLASS 2 OBESITY DUE TO EXCESS CALORIES WITHOUT SERIOUS COMORBIDITY WITH BODY MASS INDEX (BMI) OF 35.0 TO 35.9 IN ADULT: Chronic | ICD-10-CM

## 2024-10-10 PROCEDURE — 99999 PR PBB SHADOW E&M-EST. PATIENT-LVL IV: CPT | Mod: PBBFAC,,, | Performed by: NURSE PRACTITIONER

## 2024-10-10 RX ORDER — SEMAGLUTIDE 0.68 MG/ML
0.25 INJECTION, SOLUTION SUBCUTANEOUS
Qty: 3 ML | Refills: 0 | Status: SHIPPED | OUTPATIENT
Start: 2024-10-10 | End: 2024-10-11 | Stop reason: SDUPTHER

## 2024-10-10 NOTE — PATIENT INSTRUCTIONS
CMP and Vitamin D labs prior to follow up appointment - will do with PCP      Nutrition: 1,650 calories per day.      Protein: goal is a minimum of 80g/day. Protein has a slower rate of digestion = greater satiety (fullness).        Aim for <25g of added sugar per day.      Recommend to increase fiber intake.   www.fullplateliving.org      Eat Fit Shopping List.      Prepare meals in advance.      Eat breakfast within 2 hours of waking up.       Track what you eat. Research shows that people who track their food intake are more successful with weight management. This creates awareness of what you are eating/drinking and can help you see where to make changes.       Get to know your plate.  www.FlyClip.gov      Stay hydrated.     Activity: 2-3 days of strength training. This can be body weight, light weight or elastic bands exercises. [employer] and [employer] are great sources for free workout plans/videos.     Start slowly with an activity you enjoy and make it a habit.     Ask a family member or friend to get active with you.     Aim for 5,000 - 10,000 steps per day     Schedule time to make physical activity a part of your daily routine.     Exercise prescription: patient still on pelvic rest from recent delivery, will discuss at follow up appt     Sleep: 7-8 hours of sleep a night    *Recognize your progress and remember that each day is a new day*  *Stay on track, even when you feel like you're not making progress*  *Sit Less, Stand Often, Move More*  *You don't have to be perfect; be persistent*    Avoiding Weight Regain:  - continued with modified food intake (changed eating habits)  - eat breakfast every day  - weigh yourself at least once a week  - watch less than 10 hours of TV per week  - continue with increased physical activity  - physical activity, on average, about 1 hour per day      GENERAL RECOMMENDATIONS FOR A HEALTHY DIET (from UpToDate)     Eat lots of vegetables, fruits, and whole grains and a  limited amount of red meat. Get at least five servings of fruits and vegetables every day.   Tips for achieving this goal include:  Make fruits and vegetables part of every meal. Eat a variety of fruits and vegetables. Frozen or canned can be used when fresh isn't convenient.    Eat vegetables as snacks.    Have a bowl of fruit out all the time for kids to take snacks from.    Put fruit on your cereal.    Consume at least half of all grains as whole grains (like 100 percent whole wheat bread, brown rice, whole grain cereal), replacing refined grains (like white bread, white rice, refined or sweetened cereals).    Choose smaller portions and eat more slowly.    Cut down on unhealthy fats (trans fats and saturated fats) and consume more healthy fats (polyunsaturated and monounsaturated fat).   Tips for achieving this goal include:  Choose chicken, fish, and beans instead of red meat and cheese.    Cook with oils that contain polyunsaturated and monounsaturated fats, like corn, olive, and peanut oil.    Choose margarines that do not have partially hydrogenated oils. Soft margarines (especially squeeze margarines) have less trans fatty acids than stick margarines.    Eat fewer baked goods that are store-made and contain partially hydrogenated fats (like many types of crackers, cookies, and cupcakes).    When eating at fast food restaurants, choose healthy items for yourself as well as your family, like broiled chicken or salad.    If choosing prepared or processed foods, choose those labeled zero trans fat. They may still have some trans fat but likely less than similar choices not labeled zero.    Avoid sugary drinks and excessive alcohol intake.   Tips for achieving this goal include:  Choose non-sweetened and non-alcoholic beverages, like water, at meals and parties.    Avoid occasions centered around alcohol.    Avoid making sugary drinks and alcohol an essential part of family gatherings.    Keep calorie intake  balanced with needs and activity level.

## 2024-10-11 DIAGNOSIS — Z86.39 HISTORY OF INSULIN RESISTANCE: ICD-10-CM

## 2024-10-11 DIAGNOSIS — Z83.3 FAMILY HISTORY OF DIABETES MELLITUS (DM): ICD-10-CM

## 2024-10-11 DIAGNOSIS — E66.09 CLASS 2 OBESITY DUE TO EXCESS CALORIES WITHOUT SERIOUS COMORBIDITY WITH BODY MASS INDEX (BMI) OF 35.0 TO 35.9 IN ADULT: Chronic | ICD-10-CM

## 2024-10-11 DIAGNOSIS — E66.812 CLASS 2 OBESITY DUE TO EXCESS CALORIES WITHOUT SERIOUS COMORBIDITY WITH BODY MASS INDEX (BMI) OF 35.0 TO 35.9 IN ADULT: Chronic | ICD-10-CM

## 2024-10-11 RX ORDER — SEMAGLUTIDE 0.68 MG/ML
0.25 INJECTION, SOLUTION SUBCUTANEOUS
Qty: 3 ML | Refills: 0 | Status: SHIPPED | OUTPATIENT
Start: 2024-10-11

## 2024-10-23 ENCOUNTER — PATIENT MESSAGE (OUTPATIENT)
Dept: PRIMARY CARE CLINIC | Facility: CLINIC | Age: 29
End: 2024-10-23
Payer: COMMERCIAL

## 2024-11-08 ENCOUNTER — PATIENT MESSAGE (OUTPATIENT)
Dept: PRIMARY CARE CLINIC | Facility: CLINIC | Age: 29
End: 2024-11-08
Payer: COMMERCIAL

## 2024-11-20 NOTE — PROGRESS NOTES
Patient ID: [id number]     Chief Complaint: Follow-up    HPI:     The patient location is: home  The chief complaint leading to consultation is: follow up    Visit type: audiovisual    Face to Face time with patient: 7 minutes  10 minutes of total time spent on the encounter, which includes face to face time and non-face to face time preparing to see the patient (eg, review of tests), Obtaining and/or reviewing separately obtained history, Documenting clinical information in the electronic or other health record, Independently interpreting results (not separately reported) and communicating results to the patient/family/caregiver, or Care coordination (not separately reported).     Each patient to whom he or she provides medical services by telemedicine is:  (1) informed of the relationship between the physician and patient and the respective role of any other health care provider with respect to management of the patient; and (2) notified that he or she may decline to receive medical services by telemedicine and may withdraw from such care at any time.      Amanda Mcbride is a 29 y.o. female with diagnosis of IR, Hypothyroidism, Obesity. Patient referred by PCP. Patient's PCP is Vangie Huynh NP. Patient seen in clinic today for Obesity. Patient last seen in clinic on 10/10/2024.    on 09/10/2024. Has 2 children, oldest is 6 y/o.   Breastfeeding: completed    Weight history   Started weight watchers after birth of first child, did lose significant amount of weight but stopped after a year due to a [place] trip. Started on Ozempic per Dr. Goode in , had weight loss but wanted to conceive 2nd child so stopped medication. Patient did have delivery of second child on 09/10/2024 by . Patient states she is no longer breast feeding and is starting OCP this coming .   2024: at previous appt, patient started on Ozempic 0.25 mg weekly. Patient states tolerating well. Patient states she is  doing weight watchers and losing 2-3 lbs/month. States current weight is 185.6 lbs.     Previous Obesity Treatment:   Ozempic - successful weight loss  Weight watchers - successful weight loss    BMI:  35.8 (10/10/2024)  31.4 (2023)  29.8 (02/10/2023)    Weight:  Wt Readings from Last 6 Encounters:   24 86.2 kg (190 lb)   10/29/24 88.9 kg (196 lb)   10/10/24 88.9 kg (195 lb 15.8 oz)   24 94.3 kg (208 lb)   24 100.7 kg (222 lb)   24 100.7 kg (222 lb)     Ideal/Adjusted Body Weight:  Ideal: 110 lbs  Adjusted: 144 lbs    Waist Circumference:  39.5 in    Percent Body Fat:   53.4%  https://www.calculator.net/body-fat-calculator.html    Neck Circumference:   14 in    InBody: none noted    Nutrition history (24 food recall)  Breakfast: 2 egg frittata   Lunch: red beans with chicken breast, no rice  Dinner: pot roast  Snack: 2 sugar free apple sauce   Water: 40 oz/day  Sugar Sweetened beverages: zero sugar soda   Etoh: denies  Cravings: denies  Dietician: denies    Current physical activity:   None at this time,  on 09/10/2024  Barriers: recent     Stressors/Mental Health   New baby at home   Over the last two weeks how often have you been bothered by little interest or pleasure in doing things: 0  Over the last two weeks how often have you been bothered by feeling down, depressed or hopeless: 0  PHQ-2 Total Score: 0    Sleep habits   Hasn't been regular since delivery but does sleep ok    CVD screening  The ASCVD Risk score (Ronal DK, et al., 2019) failed to calculate for the following reasons:    The 2019 ASCVD risk score is only valid for ages 40 to 79    LMP:  on 09/10/2024    Contraception: starting OCP on 10/13/2024      I spent 5 minutes counseling patient on diet, physical activity and reviewing labs.     Review of patient's allergies indicates:  No Known Allergies  Current Outpatient Medications   Medication Instructions    EPINEPHrine (EPIPEN 2-ASHLEY) 0.3 mg,  "Intramuscular, Once    JUNEL FE 1.5/30, 28, 1.5 mg-30 mcg (21)/75 mg (7) tablet 1 tablet, Oral, Daily    levothyroxine (SYNTHROID) 50 mcg, Oral, Before breakfast    levothyroxine (SYNTHROID) 50 mcg, Oral, Before breakfast    loratadine (CLARITIN) 10 mg    mupirocin (BACTROBAN) 2 % ointment Topical (Top), 3 times daily    OZEMPIC 0.25 mg, Subcutaneous, Every 7 days    pen needle, diabetic 31 gauge x 3/16" Ndle 1 each, Misc.(Non-Drug; Combo Route), Weekly     Past Surgical History:   Procedure Laterality Date     SECTION       family history is not on file.    Social History     Socioeconomic History    Marital status:    Tobacco Use    Smoking status: Never    Smokeless tobacco: Never   Substance and Sexual Activity    Alcohol use: Yes     Comment: rarely    Drug use: Never    Sexual activity: Yes     Partners: Male     Birth control/protection: None     Social Drivers of Health     Financial Resource Strain: Patient Declined (9/10/2024)    Received from [facility]    Overall Financial Resource Strain (CARDIA)     Difficulty of Paying Living Expenses: Patient declined   Food Insecurity: Patient Declined (9/10/2024)    Received from [facility]    Hunger Vital Sign     Worried About Running Out of Food in the Last Year: Patient declined     Ran Out of Food in the Last Year: Patient declined   Transportation Needs: Patient Declined (9/10/2024)    Received from [facility]    PRAPARE - Transportation     Lack of Transportation (Medical): Patient declined     Lack of Transportation (Non-Medical): Patient declined   Physical Activity: Unknown (2024)    Exercise Vital Sign     Days of Exercise per Week: 3 days   Stress: Patient Declined (9/10/2024)    Received from [facility]    [employer] - Occupational Stress Questionnaire     Feeling of Stress : Patient declined   Housing Stability: Patient Declined (9/10/2024)    Received from [facility]    " "Housing Stability Vital Sign     Unable to Pay for Housing in the Last Year: Patient declined     Number of Times Moved in the Last Year: 0     Homeless in the Last Year: Patient declined       Subjective:     Review of Systems   Constitutional: Negative.    HENT: Negative.     Eyes: Negative.    Respiratory: Negative.     Cardiovascular: Negative.    Gastrointestinal: Negative.    Endocrine: Negative.    Genitourinary: Negative.    Musculoskeletal: Negative.    Skin: Negative.    Allergic/Immunologic: Negative.    Neurological: Negative.    Hematological: Negative.    Psychiatric/Behavioral: Negative.       Objective:     There were no vitals taken for this visit.      Physical Exam  Neurological:      Mental Status: She is alert and oriented to person, place, and time.   Psychiatric:         Mood and Affect: Mood normal.       Labs Reviewed:     Hematology:  Lab Results   Component Value Date    WBC 9.58 06/28/2024    HGB 11.4 (L) 06/28/2024    HCT 35.1 (L) 06/28/2024     06/28/2024     Chemistry:  Lab Results   Component Value Date    BUN 8 05/17/2024    CREATININE 0.6 05/17/2024    EGFRNORACEVR >60.0 05/17/2024    AST 10 05/17/2024    ALT 9 (L) 05/17/2024      Lab Results   Component Value Date    HGBA1C 5.3 08/20/2024      Lipid Panel:  No results found for: "CHOL", "HDL", "LDL", "TRIG", "TOTALCHOLEST"   Thyroid:  Lab Results   Component Value Date    TSH 1.135 08/20/2024      Urine:  Lab Results   Component Value Date    APPEARANCEUA Clear 07/15/2024    PROTEINUA Negative 07/15/2024    LEUKOCYTESUR Negative 07/15/2024    CREATRANDUR 72.4 05/17/2024    PROTEINURINE <7 05/17/2024        Assessment:       ICD-10-CM ICD-9-CM   1. History of insulin resistance  Z86.39 V12.29   2. Hypothyroidism, unspecified type  E03.9 244.9   3. Class 1 obesity due to excess calories without serious comorbidity with body mass index (BMI) of 34.0 to 34.9 in adult  E66.811 278.00    E66.09 V85.34    Z68.34           Plan: " "    1. History of insulin resistance (Primary)  GLP-1 improves insuling resistance of adipose tissue in obese humans.   Start semaglutide 0.25 mg SC weekly  Weight loss and exercise can help reverse insulin resistance.   Aerobic activity for 20-30 minutes 5 days a week.  Recommend Low carbohydrate or Mediterranean diet.  - semaglutide (OZEMPIC) 0.25 mg or 0.5 mg (2 mg/3 mL) pen injector; Inject 0.25 mg into the skin every 7 days.  Dispense: 3 mL; Refill: 0    2. Hypothyroidism, unspecified type  TSH level: 1.1  Continue Levothyroxine 50mcg daily  Has follow up appointment with PCP on 10/22    3. Class 2 obesity due to excess calories without serious comorbidity with body mass index (BMI) of 35.0 to 35.9 in adult  There is no height or weight on file to calculate BMI.  Wt Readings from Last 1 Encounters:   11/18/24 86.2 kg (190 lb)   Waist Circumference: 39.5 in  TSH   Date Value Ref Range Status   08/20/2024 1.135 0.400 - 4.000 uIU/mL Final     No results found for: "YOSTIENS35TR"     A modest (5-10%) weight loss improves health and quality of life.     Goal: 160 lbs  Short-term: lose 10 lbs in 12 weeks  Long-term: lose 35 lbs in 1 year  *Keep in mind that, on average, you may lose 1-2 lbs per week*    Willingness to change: 8/10    Patient qualifies for anti-obesity medications due to BMI 35.85. Anti-obesity medications are an adjunct to nutritional, physical activity, and behavioral therapies. Medication alone cannot treat obesity. I recommend starting medication to significantly improve patient's risk factor/s.   Medication:   Semaglutide  Dosing: continue 0.25 mg SC weekly (will continue since patient losing 2-3 lbs/week with medication and weight watchers).   - semaglutide (OZEMPIC) 0.25 mg or 0.5 mg (2 mg/3 mL) pen injector; Inject 0.25 mg into the skin every 7 days.  Dispense: 3 mL; Refill: 0  Discussed at length potential pharmacologic options. She desires consideration of GLP-1. Risks/benefits/common side " effects discussed patient including nausea and pain at injection site. She is aware she should not get pregnant while taking and medication not approved to take while breastfeeding. Patient must be off x 8 weeks prior to attempting pregnancy. No personal/family hx of MEN 2 or medullary thyroid cancer. No hx of thyroid nodules or biliary disease/gallstones. Also reviewed with patient gastroparesis and potential for mental health changes. Notify provider immediately if any significant side effects or issues. Discussed with patient that with substantial or rapid weight loss, gallstones could develop. Also discussed with patient GLP-1 MOA and common side effects. Instructed patient how to use with demo pen; patient practiced in office. Patient advised if approved will get medication education handout from pharmacy.  Recommend InBody due to possible muscle loss.   Birth Control: OCP  Monitor for pancreatitis   Strength training at least 2 days a week to help prevent decrease in muscle mass.     Nutrition: 1,650 calories per day.      Protein: goal is a minimum of 80g/day. Protein has a slower rate of digestion = greater satiety (fullness).        Aim for <25g of added sugar per day.      Recommend to increase fiber intake.   www.fullplateliving.org      Eat Fit Shopping List.      Prepare meals in advance.      Eat breakfast within 2 hours of waking up.       Track what you eat. Research shows that people who track their food intake are more successful with weight management. This creates awareness of what you are eating/drinking and can help you see where to make changes.       Get to know your plate.  www.myplate.gov      Stay hydrated.     Activity: 2-3 days of strength training. This can be body weight, light weight or elastic bands exercises. [employer] and [employer] are great sources for free workout plans/videos.     Start slowly with an activity you enjoy and make it a habit.     Ask a family member or friend to get  active with you.     Aim for 5,000 - 10,000 steps per day     Schedule time to make physical activity a part of your daily routine.     Exercise prescription: patient still on pelvic rest from recent delivery    Sleep: 7-8 hours of sleep a night    *Recognize your progress and remember that each day is a new day*  *Stay on track, even when you feel like you're not making progress*  *Sit Less, Stand Often, Move More*  *You don't have to be perfect; be persistent*    Avoiding Weight Regain:  - continued with modified food intake (changed eating habits)  - eat breakfast every day  - weigh yourself at least once a week  - watch less than 10 hours of TV per week  - continue with increased physical activity  - physical activity, on average, about 1 hour per day      Follow up in about 6 weeks (around 1/6/2025). In addition to their scheduled follow up, the patient has also been instructed to follow up on as needed basis.     Shara Mcgee, SHAYYP

## 2024-11-25 ENCOUNTER — OFFICE VISIT (OUTPATIENT)
Dept: PRIMARY CARE CLINIC | Facility: CLINIC | Age: 29
End: 2024-11-25
Payer: COMMERCIAL

## 2024-11-25 DIAGNOSIS — E66.811 CLASS 1 OBESITY DUE TO EXCESS CALORIES WITHOUT SERIOUS COMORBIDITY WITH BODY MASS INDEX (BMI) OF 34.0 TO 34.9 IN ADULT: ICD-10-CM

## 2024-11-25 DIAGNOSIS — E66.09 CLASS 1 OBESITY DUE TO EXCESS CALORIES WITHOUT SERIOUS COMORBIDITY WITH BODY MASS INDEX (BMI) OF 34.0 TO 34.9 IN ADULT: ICD-10-CM

## 2024-11-25 DIAGNOSIS — Z86.39 HISTORY OF INSULIN RESISTANCE: Primary | ICD-10-CM

## 2024-11-25 DIAGNOSIS — E03.9 HYPOTHYROIDISM, UNSPECIFIED TYPE: ICD-10-CM

## 2024-11-25 RX ORDER — SEMAGLUTIDE 0.68 MG/ML
0.25 INJECTION, SOLUTION SUBCUTANEOUS
Qty: 3 ML | Refills: 0 | Status: SHIPPED | OUTPATIENT
Start: 2024-11-25

## 2024-11-25 RX ORDER — PEN NEEDLE, DIABETIC 30 GX3/16"
1 NEEDLE, DISPOSABLE MISCELLANEOUS WEEKLY
Qty: 4 EACH | Refills: 3 | Status: SHIPPED | OUTPATIENT
Start: 2024-11-25

## 2024-11-25 NOTE — PATIENT INSTRUCTIONS
10 Eating Tips for Patients on GLP-1 Medications     Eat high protein foods first.  Animal based proteins - beef, chicken, fish, shellfish, turkey, pork, eggs, cheese (do not rely too heavily on cheese since it is high in fat), low carb Greek yogurt, cottage cheese, whey protein powder or drinks, protein bars.  Plant based proteins - tofu, tempeh, seitan, Quorn, Beyond Meat, pea protein, brown rice protein, pea protein powder or drinks, protein bars.    Eat non-starchy vegetables next.   Artichokes, asparagus, broccoli, brussels sprouts, cabbage, carrots, cauliflower, celery, hayley greens, cucumber, eggplant, green beans, hearts of palm, kale, letuce (all varieties), mushrooms (all varieties), okra (not breaded), onions, parsley, peppers (all varieties), pickles (dill only), radishes, sauerkraut, seaweed, snap and snow peas, spinach, sprouts, spaghetti squash, yellow squash, zucchini, tomatoes, turnip greens.     Allow some healthy fats.   Nuts, seeds, olive oil, avocado and avocado oil, peanut oil, buter, coconut oil, cheo seeds, flax seeds.   Try to limit processed seed oils like canola, corn, and soybean oil. Many are found in mayonnaise, salad dressing, margarine, and processed foods.     Eat fruit and/or starch last if you are still hungry.  Best fruits (not juices) - berries, apples, oranges, peaches pears, kiwi.   Best complex starches - ½ cup beans (like galvan, lima, navy, chili, black, etc.), low carb tortillas or zero net carb street tacos, ½ cup cooked chickpea or lentil pasta, ½ cup sweet potatoes, flourless or keto bread, ½ cup cooked old fashioned or steel cut oats, ½ cup cooked parboiled rice, ½ cup cooked brown rice, ½ cup cooked wild rice, ½ cup cooked basmati rice, 3 cups popped popcorn.     Avoid high fat and fried foods.     Avoid processed meats.     Avoid refined carbohydrates and sugar.  White bread, white rice, pasta   Desserts - cookies, pastries, ice cream   Most breakfast cereals    Sugar-sweetened beverages, candies, chocolate     Limit carbonated beverages - can contribute to bloating.     Limit alcohol intake.     Eat slowly and avoid feeling too full.

## 2024-12-05 ENCOUNTER — PATIENT MESSAGE (OUTPATIENT)
Dept: PRIMARY CARE CLINIC | Facility: CLINIC | Age: 29
End: 2024-12-05
Payer: COMMERCIAL

## 2024-12-19 NOTE — PROGRESS NOTES
Patient ID: [id number]     Chief Complaint: Follow-up    HPI:     Amanda Mcbride is a 29 y.o. female with diagnosis of IR, Hypothyroidism, Obesity. Patient referred by PCP. Patient's PCP is Vangie Huynh NP. Patient seen in clinic today for Obesity. Patient last seen in clinic on 2024.    on 09/10/2024. Has 2 children, oldest is 4 y/o.   Breastfeeding: completed    Weight history   Started weight watchers after birth of first child, did lose significant amount of weight but stopped after a year due to a [place] trip. Started on Ozempic per Dr. Goode in , had weight loss but wanted to conceive 2nd child so stopped medication. Patient did have delivery of second child on 09/10/2024 by . Patient states she is no longer breast feeding and is starting OCP this coming .   2024: at previous appt, patient started on Ozempic 0.25 mg weekly. Patient states tolerating well. Patient states she is doing weight watchers and losing 2-3 lbs/month. States current weight is 185.6 lbs.   2024: at previous appt, patient continued on Ozempic 0.25 mg weekly. Patient states tolerating medication well. States feels appetite increasing, wanting to snack more. Using Weight Watchers sumaya. Trying to hit protein goal.     Previous Obesity Treatment:   Ozempic - successful weight loss  Weight watchers - successful weight loss    BMI:  33.4 (2024)  35.8 (10/10/2024)  31.4 (2023)  29.8 (02/10/2023)    Weight:  Wt Readings from Last 6 Encounters:   24 83 kg (182 lb 15.7 oz)   24 86.2 kg (190 lb)   10/29/24 88.9 kg (196 lb)   10/10/24 88.9 kg (195 lb 15.8 oz)   24 94.3 kg (208 lb)   24 100.7 kg (222 lb)     Ideal/Adjusted Body Weight:  Ideal: 110 lbs  Adjusted: 144 lbs    Neck Circumference:   14 in    Waist Circumference:  39.5 in    Percent Body Fat:   53.4%  https://www.calculator.net/body-fat-calculator.html    InBody: none noted    Nutrition history (24 food  "recall)  Breakfast: none  Lunch: red beans with chicken breast, no rice  Dinner: pot roast  Snack: 2 sugar free apple sauce   Water: 40 oz/day  Sugar Sweetened beverages: zero sugar soda   Etoh: denies  Satiety cues:   Cravings: denies  Food noise:   Dietician: denies    Current physical activity:   None at this time,  on 09/10/2024  Barriers: recent     Stressors/Mental Health   New baby at home   Over the last two weeks how often have you been bothered by little interest or pleasure in doing things: 0  Over the last two weeks how often have you been bothered by feeling down, depressed or hopeless: 0  PHQ-2 Total Score: 0    Sleep habits   Hasn't been regular since delivery but does sleep ok    CVD screening  The ASCVD Risk score (Ronal BARRON, et al., 2019) failed to calculate for the following reasons:    The 2019 ASCVD risk score is only valid for ages 40 to 79    LMP:  on 09/10/2024    Contraception: starting OCP on 10/13/2024      I spent 9 minutes counseling patient on diet, physical activity and reviewing labs.     Review of patient's allergies indicates:  No Known Allergies  Current Outpatient Medications   Medication Instructions    EPINEPHrine (EPIPEN 2-ASHLEY) 0.3 mg, Intramuscular, Once    JUNEL FE 1.5/30, 28, 1.5 mg-30 mcg (21)/75 mg (7) tablet 1 tablet, Oral, Daily    OZEMPIC 0.5 mg, Subcutaneous, Every 7 days    pen needle, diabetic 31 gauge x 3/16" Ndle 1 each, Misc.(Non-Drug; Combo Route), Weekly     Past Surgical History:   Procedure Laterality Date     SECTION       family history is not on file.    Social History     Socioeconomic History    Marital status:    Tobacco Use    Smoking status: Never    Smokeless tobacco: Never   Substance and Sexual Activity    Alcohol use: Yes     Comment: rarely    Drug use: Never    Sexual activity: Yes     Partners: Male     Birth control/protection: None     Social Drivers of Health     Financial Resource Strain: Patient " "Declined (9/10/2024)    Received from [facility]    Overall Financial Resource Strain (CARDIA)     Difficulty of Paying Living Expenses: Patient declined   Food Insecurity: Patient Declined (9/10/2024)    Received from [facility]    Hunger Vital Sign     Worried About Running Out of Food in the Last Year: Patient declined     Ran Out of Food in the Last Year: Patient declined   Transportation Needs: Patient Declined (9/10/2024)    Received from [facility]    PRAPARE - Transportation     Lack of Transportation (Medical): Patient declined     Lack of Transportation (Non-Medical): Patient declined   Physical Activity: Unknown (11/25/2024)    Exercise Vital Sign     Days of Exercise per Week: 3 days   Stress: Patient Declined (9/10/2024)    Received from [facility]    [employer] - Occupational Stress Questionnaire     Feeling of Stress : Patient declined   Housing Stability: Patient Declined (9/10/2024)    Received from [facility]    Housing Stability Vital Sign     Unable to Pay for Housing in the Last Year: Patient declined     Number of Times Moved in the Last Year: 0     Homeless in the Last Year: Patient declined       Subjective:     Review of Systems   Constitutional: Negative.    HENT: Negative.     Eyes: Negative.    Respiratory: Negative.     Cardiovascular: Negative.    Gastrointestinal: Negative.    Endocrine: Negative.    Genitourinary: Negative.    Musculoskeletal: Negative.    Skin: Negative.    Allergic/Immunologic: Negative.    Neurological: Negative.    Hematological: Negative.    Psychiatric/Behavioral: Negative.       Objective:     Visit Vitals  /72 (BP Location: Left arm, Patient Position: Sitting)   Pulse 87   Temp 98.3 °F (36.8 °C) (Tympanic)   Ht 5' 2" (1.575 m)   Wt 83 kg (182 lb 15.7 oz)   LMP  (LMP Unknown)   SpO2 98%   Breastfeeding No   BMI 33.47 kg/m²       Physical Exam  Vitals reviewed.   Constitutional:       Appearance: " "Normal appearance. She is obese.   HENT:      Head: Normocephalic and atraumatic.   Eyes:      Extraocular Movements: Extraocular movements intact.      Conjunctiva/sclera: Conjunctivae normal.      Pupils: Pupils are equal, round, and reactive to light.   Cardiovascular:      Rate and Rhythm: Normal rate and regular rhythm.      Heart sounds: Normal heart sounds.   Pulmonary:      Effort: Pulmonary effort is normal.      Breath sounds: Normal breath sounds.   Abdominal:      General: Bowel sounds are normal.   Musculoskeletal:         General: Normal range of motion.      Cervical back: Normal range of motion.   Skin:     General: Skin is warm and dry.   Neurological:      Mental Status: She is alert and oriented to person, place, and time.   Psychiatric:         Mood and Affect: Mood normal.         Behavior: Behavior normal.       Labs Reviewed:     Hematology:  Lab Results   Component Value Date    WBC 9.58 06/28/2024    HGB 11.4 (L) 06/28/2024    HCT 35.1 (L) 06/28/2024     06/28/2024     Chemistry:  Lab Results   Component Value Date    BUN 8 05/17/2024    CREATININE 0.6 05/17/2024    EGFRNORACEVR >60.0 05/17/2024    AST 10 05/17/2024    ALT 9 (L) 05/17/2024      Lab Results   Component Value Date    HGBA1C 5.3 08/20/2024      Lipid Panel:  No results found for: "CHOL", "HDL", "LDL", "TRIG", "TOTALCHOLEST"   Thyroid:  Lab Results   Component Value Date    TSH 1.135 08/20/2024      Urine:  Lab Results   Component Value Date    APPEARANCEUA Clear 07/15/2024    PROTEINUA Negative 07/15/2024    LEUKOCYTESUR Negative 07/15/2024    CREATRANDUR 72.4 05/17/2024    PROTEINURINE <7 05/17/2024        Assessment:       ICD-10-CM ICD-9-CM   1. History of insulin resistance  Z86.39 V12.29   2. Class 1 obesity due to excess calories without serious comorbidity with body mass index (BMI) of 34.0 to 34.9 in adult  E66.811 278.00    E66.09 V85.34    Z68.34        Plan:     1. History of insulin resistance " "(Primary)  GLP-1 improves insuling resistance of adipose tissue in obese humans.   Weight loss and exercise can help reverse insulin resistance.   Aerobic activity for 20-30 minutes 5 days a week.  Recommend Low carbohydrate or Mediterranean diet.  Increase Semaglutide to 0.5 mg SC weekly    2. Class 2 obesity due to excess calories without serious comorbidity with body mass index (BMI) of 35.0 to 35.9 in adult  Body mass index is 33.47 kg/m².  Wt Readings from Last 1 Encounters:   12/20/24 83 kg (182 lb 15.7 oz)   Waist Circumference: 39.5 in  TSH   Date Value Ref Range Status   08/20/2024 1.135 0.400 - 4.000 uIU/mL Final     No results found for: "MVGIYBDZ62LC"     A modest (5-10%) weight loss improves health and quality of life.     Goal: 160 lbs  Short-term: lose 10 lbs in 12 weeks  Long-term: lose 35 lbs in 1 year  *Keep in mind that, on average, you may lose 1-2 lbs per week*    Willingness to change: 8/10    Patient qualifies for anti-obesity medications due to BMI 33. Anti-obesity medications are an adjunct to nutritional, physical activity, and behavioral therapies. Medication alone cannot treat obesity. I recommend starting medication to significantly improve patient's risk factor/s.   Medication:   Semaglutide  Dosing: increase dose to 0.5 mg SC weekly    Discussed at length potential pharmacologic options. She desires consideration of GLP-1. Risks/benefits/common side effects discussed patient including nausea and pain at injection site. She is aware she should not get pregnant while taking and medication not approved to take while breastfeeding. Patient must be off x 8 weeks prior to attempting pregnancy. No personal/family hx of MEN 2 or medullary thyroid cancer. No hx of thyroid nodules or biliary disease/gallstones. Also reviewed with patient gastroparesis and potential for mental health changes. Notify provider immediately if any significant side effects or issues. Discussed with patient that with " substantial or rapid weight loss, gallstones could develop. Also discussed with patient GLP-1 MOA and common side effects. Instructed patient how to use with demo pen; patient practiced in office. Patient advised if approved will get medication education handout from pharmacy.  Recommend InBody due to possible muscle loss.   Birth Control: OCP  Monitor for pancreatitis   Strength training at least 2 days a week to help prevent decrease in muscle mass.     Nutrition: 1,650 calories per day.      Protein: goal is 95/day. Protein has a slower rate of digestion = greater satiety (fullness).        Aim for <25g of added sugar per day.      Recommend to increase fiber intake.   www.fullplateliving.org      Eat Fit Shopping List.      Prepare meals in advance.      Eat breakfast within 2 hours of waking up.       Track what you eat. Research shows that people who track their food intake are more successful with weight management. This creates awareness of what you are eating/drinking and can help you see where to make changes.       Get to know your plate.  www.[employer].gov      Stay hydrated.     Activity: 2-3 days of strength training. This can be body weight, light weight or elastic bands exercises. [employer] and [employer] are great sources for free workout plans/videos.     Start slowly with an activity you enjoy and make it a habit.     Ask a family member or friend to get active with you.     Aim for 5,000 - 10,000 steps per day     Schedule time to make physical activity a part of your daily routine.     Exercise prescription: patient still on pelvic rest from recent delivery    Sleep: 7-8 hours of sleep a night    *Recognize your progress and remember that each day is a new day*  *Stay on track, even when you feel like you're not making progress*  *Sit Less, Stand Often, Move More*  *You don't have to be perfect; be persistent*    Avoiding Weight Regain:  - continued with modified food intake (changed eating  habits)  - eat breakfast every day  - weigh yourself at least once a week  - watch less than 10 hours of TV per week  - continue with increased physical activity  - physical activity, on average, about 1 hour per day      Follow up in about 8 weeks (around 2/14/2025) for Virtual Visit. In addition to their scheduled follow up, the patient has also been instructed to follow up on as needed basis.     Shara Mcgee, [email or web address]

## 2024-12-20 ENCOUNTER — OFFICE VISIT (OUTPATIENT)
Dept: PRIMARY CARE CLINIC | Facility: CLINIC | Age: 29
End: 2024-12-20
Payer: COMMERCIAL

## 2024-12-20 VITALS
BODY MASS INDEX: 33.68 KG/M2 | HEART RATE: 87 BPM | SYSTOLIC BLOOD PRESSURE: 114 MMHG | OXYGEN SATURATION: 98 % | TEMPERATURE: 98 F | WEIGHT: 183 LBS | DIASTOLIC BLOOD PRESSURE: 72 MMHG | HEIGHT: 62 IN

## 2024-12-20 DIAGNOSIS — E66.09 CLASS 1 OBESITY DUE TO EXCESS CALORIES WITHOUT SERIOUS COMORBIDITY WITH BODY MASS INDEX (BMI) OF 34.0 TO 34.9 IN ADULT: Chronic | ICD-10-CM

## 2024-12-20 DIAGNOSIS — Z86.39 HISTORY OF INSULIN RESISTANCE: Primary | ICD-10-CM

## 2024-12-20 DIAGNOSIS — E66.811 CLASS 1 OBESITY DUE TO EXCESS CALORIES WITHOUT SERIOUS COMORBIDITY WITH BODY MASS INDEX (BMI) OF 34.0 TO 34.9 IN ADULT: Chronic | ICD-10-CM

## 2024-12-20 PROCEDURE — 99999 PR PBB SHADOW E&M-EST. PATIENT-LVL III: CPT | Mod: PBBFAC,,, | Performed by: NURSE PRACTITIONER

## 2024-12-20 RX ORDER — SEMAGLUTIDE 0.68 MG/ML
0.5 INJECTION, SOLUTION SUBCUTANEOUS
Qty: 9 ML | Refills: 0 | Status: SHIPPED | OUTPATIENT
Start: 2024-12-20

## 2024-12-20 NOTE — PATIENT INSTRUCTIONS
"    PRODUCE  [] All fresh fruit   [] All fresh vegetables   [] All fresh herbs  [] All herb purees + pastes  [] Pre-spiralized vegetable noodles   [] Steam-In-The-Bag begetables  [] Riced cauliflower  [] Jicama sticks  [] Love Beets  all varieties  [] Wholly Guacamole  all varieties  [] Hummus  all varieties, chickpea + vegetable  [] Tofu Shirataki noodles    [] Tofu  all varieties  [] Tempeh  all varieties    PROTEIN  CHICKEN   [] Boneless, skinless breasts  [] Boneless, skinless thighs  [] Ground chicken breast, at least 93% lean  [] Chicken breast cutlet  [] Aidell's  Chicken Sausage + Chicken Meatballs    TURKEY   [] Turkey breast tenderloin   [] Ground turkey breast, at least 93% lean  [] Aimee Naturals  Turkey Sausage    BEEF  [] Tenderloin  [] Sirloin  [] Top Loin  [] Flank Steak  [] Round Steak  [] Filet  [] Lean ground beef, at least 93% lean + grass-fed preferable    PORK  [] Tenderloin  [] Pork Chop  [] Center Cut  [] Beason Naturals  No-Sugar [place]    BISON  [] [place]  90 - 95% lean    SEAFOOD  [] All fresh fish + seafood; locally sourced when possible  [] Smoked salmon    HEAT + EAT ENTREES   [] Carlton's Natural Foods  Chicken, Pork, Beef  [] Von  "All Natural" Grilled Chicken Breast + Strips, all varieties    SAUCES SPREADS + DIPS  [] Bitchin Sauce  Original, Chipotle, Cilantro [place]  [] Melecio's Kitchen  Tzatziki Yogurt Dip, Babaganoush, Hummus  [] Wholly Guacamole  all varieties  [] Hummus  all varieties  [] Carrabelle Gringo Salsa  all varieties  [] Mrs. Lashon's Salsa  all varieties  [] Stubb's All Natural BBQ Sauce  [] Primal Kitchen  Young, Ketchup, BBQ Sauce  [] Primal Kitchen Pasta Sauce  Roasted Garlic, Tomato Basil, no-dairy Vodka Sauce  [] Sal & Mariana's  HeartSmart Pasta Sauce    DAIRY/DAIRY SUBSTITUTES/EGGS  EGGS   [] All eggs  cage-free, pasture-raise preferable  [] Cresaadi  egg wraps  [] Vital Farms  Pasture-Raised Egg Bites  [] JUST Egg [vegan]     CREAMERS "   [] Califia  Better Half, original + vanilla unsweetened  [] NutPods  all varieties    MILK   [] Horizon Organic  all varieties except chocolate  [] Organic Valley  all varieties except chocolate  [] Organic Valley  ultra-filtered, reduced fat milk     PLANT_BASED MILK ALTERNATIVES  [] All unsweetened almond milks  original, vanilla + chocolate  [] Ripple  unsweetened   [] Milkadamia  original +_ vanilla, unsweetened   [] Forager  original + vanilla, unsweetened   [] Silk Organic  soy milk, unsweetened  [] Oatly  unsweetened  [] Califia  regular + protein-fortified oat milk, unsweetened     CHEESES  [] Regular or reduced fat cheeses  [] BelGioso  Fresh Mozzarella Snack Packs, Parmesan Power-full Snack   [] Goat cheese  [] Fresh mozzarella  [] String cheese  all varieties  [] Maria R Cottage Cheese  [] Camelai's Cultured Cottage Cheese  [] Tanesha Life 'Just Like Mozzarella'  plant-based shreds and other varieties  [] Parmela Creamery  plant-based shredded cheese    YOGURT  [] Fage  2% low-fat, plain  [] Siggi's  plain, vanilla  [] Chobani Greek  nonfat + whole milk yogurt, plain   [] Chobani Less Sugar  all flavored varieties   [] Oikos Greek  nonfat, plain  [] Two Good  all varieties   [] [facility] Provisions  plain  [] [place] Organic  low-fat + nonfat, plain  [] [facility]  goat milk yogurt, plain  [] Kefit  unsweetened, plain  [] Forager  Greek style unsweetened, plain [dairy-free]  [] [place]  unsweetened Greek style, plain [dairy-free]  [] [facility]  almond milk yogurt, vanilla or plain, unsweetened [dairy-free]    FREEZER SECTION  FROZEN VEGGIES  [] All plain frozen veggies + greens [e.g. broccoli, brussels, carrots, okra, mushrooms, zucchini, yellow squash, butternut squash, kale, spinach, hayley greens]  [] Riced veggies [e.g. cauliflower, broccoli, butternut squash]  [] Edamame  all varieties  [] Green Giant  [] Veggie Spirals  [] Marinated Veggies [e.g. eggplant, peppers,  zucchini]  [] Simply Steam [place] Sprouts  [] Birds Eye  [] Power Blend Italian Style  lentils, broccoli, kale, zucchini  []  [place] Sprouts & Carrots  [] Oven Roasters Broccoli & Cauliflower  [] [place] Blend  [] Tattooed   [] Green Bean Blend  [] Farmer's Market Ratatouille  [] Butter Balsamic Glazed Vegetables  [] Riced Cauliflower & Quinoa Mediterranean Style  [] Lupis's Good Life  Southern Style Greens [sauteed kale + onion]    FROZEN FRUITS  [] All unsweetened frozen fruits  all varieties  [] Dole Fruits & Veggie Blends  Berries 'n Kale  [] Dole Mix-ins  Triple Berry     FROZEN ENTREES  [] The Good Kitchen meals  all varieties [ e.g. Chili Lime Chicken Over Riced Cauliflower]  [] Premium Paleo  Not Stanley Momma's Meatloaf  [] Primal Kitchen  Chicken Pesto + Steak Fajitas w/ Peppers & Onions  [] Eating Well Frozen Entrees  Butter Chicken Masala, Steak Carne Asada, Creamy Pesto Chicken, Chicken + Wild Rice Stroganoff, Yellow Jones Chicken, Sun-dried Tomato Chicken, Chicken Lo Mein  [] Realgood Entree Bowls  Niuean Inspired Beef Bowl over Riced Cauliflower, Chicken Burrito Bowl   [] Great Karma Coconut Jones  [] Maricel's  T[place]ale Abril with Black Beans, Vegetable Lasagna  [] Kashi Mayan [place] Bake  [] Healthy Choice  Simply Steamers Chicken Fried Rice  [] Basil Pesto Chicken & Kyrgyz Style Pork Power Bowls  [] Tattooed   Enchilada Bowl  [] Lalo Farms  Spicy Black Bean Burgers    FROZEN PIZZAS  [] Cauli'flour Foods  Cauliflower Pizza Crusts  [] Outer Aisle  Cauliflower Crust  [] Maricel's  Veggie Crust Cheese Pizza  [] Quest Pizza     VEGETARIAN PRODUCTS  [] Beyond Meat  ground 'meat' + grilled 'chicken' strips  [] Tofurkey  Original Italian Sausage + Original Tempeh  [] Gardein  Beefless Ground + Meatless Meatballs  [] aLlo Farms Grillers  Original Burger, Crumbles, Meatballs    ICE CREAMS + FROZEN DESSERTS  [] Halo Top  regular + keto series, pops  [] Mao   ice cream + dessert sandwiches  [] Enlightened  ice cream + bars  [] Nightfood  ice cream  [] Realgood  ice cream  [] Arctic Zero Fit  frozen pint  [] The Frozen Farmer  sorbets  [] Wholly Rollies  Protein Balls, all varieties  [] Dream Pops  Coconut Latte    FROZEN BREAKFASTS  [] Realgood  Breakfast Sandwiches on Cauliflower Cheesy Bread  [] Rebel  ice cream + dessert sandwiches  [] Enlightened  ice cream + bars  [] Nightfood  ice cream  [] Realgood  ice cream  [] Arctic Zero Fit  frozen pint  [] The Frozen Farmer  sorbets  [] Wholly Rollies  Protein Balls, all varieties  [] Dream Pops  Coconut Latte    BREADS/BUNS/WRAPS  [] Benjamin Bread: All Types - In Freezer Section   [] Flat Out Light Wraps - All Varieties   [] Flat Out Protein Up Carb Down Flat Bread   [] Kontos Whole Wheat Pocket Kesha   [] Gautam NICHOLAS 100% Whole Wheat Tortillas   [] LaTortbabberly Factory Tortillas - Smart & Delicious; 50 or 80-calorie   [] Nature's Own 100% Whole Wheat Bread   [] Orowheat Healthful - 100% Whole Wheat Slice Bread and Milfay Thins   [] Orowheat Healthful - Whole Wheat Nuts & Grain Bread; Flax & Seed Bread   [] Pepperidge Farm Natural Whole Grain 15 Bread   [] Pepperidge Farm Natural Whole Grain English Muffin - 100% Whole Wheat   [] Pepperidge Farm Very Thin 100% Whole Wheat   [] Kaylin Montalvo 45 Calories and Delightful   [] Nina 100% Whole Wheat Thin-Sliced Bagels and English Muffins   [] Western Bagel: Perfect 10     GLUTEN FREE  [] Domingo's Gluten Free Bread   [] [place] Bakehouse 7-Grain Gluten Free Bread     LEGUME PASTA   [] Explore Asian Organic Black Bean Spaghetti   [] Modern Table   [] Tolerant Foods     NUT BUTTERS & JELLIES  NUT BUTTERS   [] Better'n Chocolate: Coconut Chocolate Peanut Butter Spread   [] Better'n Peanut Butter - All Types   [] Earth Balance Coconut and Peanut Spread   [] Francisco's Nut [place]   [] MaraNatha: All Natural Roasted Cashew Butter - [place] or Creamy   [] MaraNatha: Roasted Peanut  Butter   [] Nuts 'N More Peanut [place] - All Flavors   [] PB2 Powder - Original or Chocolate   [] Skippy Natural - Creamy, Super Chunk   [] Smart Balance Peanut Butter - Delta or Creamy   [] Peanut Butter & Company:   [] Smooth , Crunch Time, The Heat Is On, Old Fashioned Smooth, Mighty Nut- Powdered Peanut Butter, Squeeze Pack   [] Smucker's Natural Peanut Butter - Delta or Creamy   [] Sunbutter Nut Butter   [] Wild Friends Protein Peanut Butter/Whiteside o Butter - Vanilla or Chocolate     JELLIES  o Polaner's All Fruit   o Clearly Organic Best Choice: Strawberry Fruit Spread     SNACKS  BARS  [] Kashi Bars - Chewy or Crunchy; Honey Whiteside o Flax or Peanut Butter   [] KIND Bars - 5 Grams of Sugar or Less   [] KIND Protein Bars - Strong and KIND   [] Nature Valley Protein Bar - All Varieties   [] Nature [place] Roasted Nut Crunch - Whiteside Crunch; Peanut Crunch   [] [facility] Simple Nut Bar - Roasted Peanut & Honey   [] [facility] Simple Nut Bar - [place], Cashew & Sea Salt   [] [facility] Nut Cuming Bar - Salted Caramel Peanut   [] Think Thin Protein Bars   [] Quest Bars, Power Crunch Bars, Pure Protein Bars     BEEF JERKY - NITRATE FREE   [] Game On   [] Grass Run Farms   [] Krave   [] Ostrim   [] Perky Jerky   [] Primal Strips Meatless Vegan Jerky   [] [place]     CHIPS   [] Beanitos Chips   [] Fruit Crisps - e.g. Brother's-All-Natural, Bare Fruit, Yoga Chips   [] Katie's Soy Crisps: 1.3 ounce bag   [] Quest Protein Chips   [] Wasa Whole Wheat Crisp Bread     CRACKERS  [] Babita's Gone Crackers   [] Nabisco Triscuit: Regular and Thin Crisp Crackers   [] Vans Say Cheese Crackers (G-F)     POPCORN/NUTS   [] Gordon Braswell's Smart Pop Popcorn - Single Serving   [] 100-Calorie Pack of Nuts - All Varieties     PROTEIN POWDERS & DRINKS  []  Protein -  Whey Protein Powder   [] Garden of Life Raw Protein Powder   [] Iconic Ready-To-Drink Protein Drink   [] Richardson One Protein Powder   []  VegaSport Protein Powder     SALSA/HUMMUS/DIPS   [] Eat Well Embrace Life: Zesty Sriracha Carrot o Hummus   [] Pre-Portioned Guacamole Packs   [] Kang's   [] Tostitos Restaurant Style Salsa       SOUPS   [] Maricel's Organic Soups - Lentil, Vegetable, Split Pea, Low-Sodium     CANNED GOODS   [] 100% Pure Pumpkin   [] BlueRunner Creole Cream-Style Red Beans or Navy Beans   [] Cajun Power Chicken Gumbo Base   [] Chicken of the Sea [place]   [] Andrés Fresh Cut Sliced Beets   [] Hormel Breast of Chicken in Water   [] LeSuer Tender Baby Whole Carrots   [] Nicole Tabasco [place] Starter   [] Carlosist: Chunk Lite Tuna in Water, Gourmet Select Pouches   [] StarKist: Yellowfin Tuna Fillets   [] Trappey's: Kidney, Butter, Almendarez, Black Eye, Field, and Black Beans   [] CECIL Saunders's Turnip Greens or [place] Spinach     CONDIMENTS/ SAUCES/SPREADS/ SPICES  [] Aneesh Cobb's Magic Seasonings - Regular or Salt Free   [] Yunior Tovar's Sauces - All Flavors   [] Laughing Cow Light - All Flavors   [] Dash Salt-Free Marinade - All Flavors   [] Hang & Mariana's: Heart Smart Pasta Sauces   [] Tabasco     SALAD DRESSINGS  [] Kristal's Naturals: Lite Honey Mustard   [] Narendra's Own: Lighten Up Salad Dressing - All Varieties   [] OPA Greek Yogurt Dressings - Ranch, Blue o Cheese, Caesar, Feta Dill     SWEETENERS  [] Sweet [place] Sweetener   [] Swerve   [] Truvia     BEVERAGES  [] Coconut Water   [] Crystal Light PURE - All Flavors   [] Honest Tea: Just Green Tea, Unsweetened   [] Kombucha Tea   [] [place]   [] [place] Sisters Bloody Babita Mix   [] Metromint - Zero-Sugar; All Natural Flavored   [] Jaime - Plain or Flavored   [] Marguerite Padilla   [] Steaz - Zero-Sugar, All-Natural, Sparkling Tea   [] Tea Bags: Any Brand - e.g. Nikki, Yogi, Tazzo, Celestial   [] V8 100% Vegetable Juice   [] Vitamin Water Zero   [] Water   [] Zevia - Stevia Sweetened Soft Drink     BEER/NIKOLAY/LIQUORS  []Payne's Premier Light 64 Calories   [] Bud Select - 55  Calories   [] [place] Sisters Bloody Babita Mix   [] Costa Genuine Draft - 64 Calories   [] Red or White Wine - All Varieties     CEREALS: HOT/COLD   [] Argelia's Evelinffin's Original Cereal  [] Eldon's Mill Oat Bran Hot Cereal - Cracked Wheat, Multi-Grain  [] Kashi GoLean Cereal  [] Kashi GoLean Hot Cereal packets - Vanilla; Honey Cinnamon  [] Tong's Special K Protein Cereal  [] Nitin's Steel Cut Marialuisa Oatmeal  [] Nature's Path Smart Bran  [] Religious Instant Oatmeal packet, Original  [] Religious Old Fashioned Religious Oats  []  Rajesh's Whole Wheat & Flaxseed Original Cereal

## 2025-01-17 ENCOUNTER — PATIENT MESSAGE (OUTPATIENT)
Dept: PRIMARY CARE CLINIC | Facility: CLINIC | Age: 30
End: 2025-01-17
Payer: COMMERCIAL

## 2025-03-28 ENCOUNTER — OFFICE VISIT (OUTPATIENT)
Dept: PRIMARY CARE CLINIC | Facility: CLINIC | Age: 30
End: 2025-03-28
Payer: COMMERCIAL

## 2025-03-28 VITALS
HEIGHT: 62 IN | OXYGEN SATURATION: 98 % | SYSTOLIC BLOOD PRESSURE: 112 MMHG | HEART RATE: 75 BPM | DIASTOLIC BLOOD PRESSURE: 68 MMHG | RESPIRATION RATE: 18 BRPM | WEIGHT: 170.88 LBS | BODY MASS INDEX: 31.45 KG/M2

## 2025-03-28 DIAGNOSIS — E66.09 CLASS 1 OBESITY DUE TO EXCESS CALORIES WITHOUT SERIOUS COMORBIDITY WITH BODY MASS INDEX (BMI) OF 34.0 TO 34.9 IN ADULT: Chronic | ICD-10-CM

## 2025-03-28 DIAGNOSIS — E66.811 CLASS 1 OBESITY DUE TO EXCESS CALORIES WITHOUT SERIOUS COMORBIDITY WITH BODY MASS INDEX (BMI) OF 34.0 TO 34.9 IN ADULT: Chronic | ICD-10-CM

## 2025-03-28 DIAGNOSIS — Z86.39 HISTORY OF INSULIN RESISTANCE: Primary | ICD-10-CM

## 2025-03-28 PROCEDURE — 99999 PR PBB SHADOW E&M-EST. PATIENT-LVL IV: CPT | Mod: PBBFAC,,, | Performed by: NURSE PRACTITIONER

## 2025-03-28 RX ORDER — SEMAGLUTIDE 0.68 MG/ML
0.5 INJECTION, SOLUTION SUBCUTANEOUS
Qty: 9 ML | Refills: 0 | Status: SHIPPED | OUTPATIENT
Start: 2025-03-28

## 2025-03-28 NOTE — PATIENT INSTRUCTIONS
Nutritional Diets:  Mediterranean Diet  Therapeutic Lifestyle Diet  DASH (Dietary Approaches to Stop Hypertension)  Atkins Diet  Ornish Diet  Paleo Diet  Vegetarian Diet  Commercial Diet Programs      Websites for Recipe Inspiration:   Playchemy   [email or web address]  Budget Bytes   [email or web address]  Real Food Dietitians   [email or web address]  Eating Bird Food   [email or web address]  Fit Foodie Finds   [email or web address]  Wholesome Yum   [email or web address]  Oh Snap Macros   [email or web address]  35 Macro Friendly Meal Prep Recipes   [email or web address]  9 Meal Prep Success Tips   [email or web address]      Social Media Recommendations:  Dietitian Laurel Connolly Nutritionist  RASHIDA Kendrick RD Johnny Hadac Josh New, RD, CSSD, CSCS      Additional Recommendations:  Limit added sugars to <25 g per day.  Hydration: At least 1/2 your body weight in ounces per day + additional 20 ounces for each hour of activity or sweat loss.   1 serving of protein = 10 grams or 1 ounce  1 serving of carbs = 20 grams or approx 1/4 to 1/3 cup  1 serving of fat = 10 grams or about 1 tablespoon   When looking for a lean protein, look for protein > fat on the nutrition label. If fat > protein, it is considered a high fat meat and should be limited during times of weight loss.   Non-starchy vegetables include all vegetables except:   Potatoes/Sweet Potatoes  Corn  Peas   Shaffer Beans  Winter squash such as acor squash   High Fiber Carbohydrates sources:   Beans/lentils  Whole grains  Potatoes with skin on  Quinoa  Rice  Peas  Corn   Shaffer beans  Carb balance tortillas or flatout wraps  All fruits  Oatmeal   High Fiber Foods:  Raspberries, 1 cup, 8 grams  Pear, 1 medium, 5.5 grams  Apple with skin, 1 medium, 4.5 grams  Green peas, 1 cup, 9 grams  Broccoli, 1 cup chopped, 5 grams  [place] sprouts, 1 cup, 4 grams  Whole wheat pasta, 1 cup, 6  grams  Quinoa cooked, 1 cup, 5 grams  Plain popcorn,3 cups, 3.5 grams  Lentils, 1 cup, 15.5 grams  Edis seeds, 1 tablespoons, 3 grams  Almonds, 1 ounce, 3.5 grams  Black beans, 1/2 cup, 7.5 grams  Kaylin Selvin Delightful bread, 2 slices, 5 grams  Carb blanace tortillas, 1 soft taco sized, 15 grams  Minimum goal is 25 grams of fiber per day for women and 35 grams per day for men.   Frozen Meal Guidelines:   <400 calories  20 grams or more of protein  Top with rotisserie chicken for extra protein  Pair with additional fiber on the side to make it filling   Piece of fruit, 1/2 a salad kit, bag of frozen vegetables

## 2025-03-28 NOTE — PROGRESS NOTES
Patient ID: [id number]     Chief Complaint: Follow-up    HPI:     Amanda Mcbride is a 29 y.o. female with diagnosis of IR, Hypothyroidism, Obesity. Patient referred by PCP. Patient's PCP is Vangie Huynh NP. Patient seen in clinic today for Obesity. Patient last seen in clinic on 2024.     Weight history   Started weight watchers after birth of first child, did lose significant amount of weight but stopped after a year due to a [place] trip. Started on Ozempic per Dr. Goode in , had weight loss but wanted to conceive 2nd child so stopped medication. Patient did have delivery of second child on 09/10/2024 by . Patient states she is no longer breast feeding and is starting OCP this coming .   2024: at previous appt, patient started on Ozempic 0.25 mg weekly. Patient states tolerating well. Patient states she is doing weight watchers and losing 2-3 lbs/month. States current weight is 185.6 lbs.   2024: at previous appt, patient continued on Ozempic 0.25 mg weekly. Patient states tolerating medication well. States feels appetite increasing, wanting to snack more. Using Weight Watchers sumaya. Trying to hit protein goal.   2025: at previous appt, Ozempic increased to 0.5 mg weekly. Tolerating well. Aiming for protein goal, having difficulty reaching goal of 95 g/day. Medication helping with weight loss, still feels hungry after eating but is able to not eat more.     Previous Obesity Treatment:   Ozempic - successful weight loss  Weight watchers - successful weight loss    BMI:  31.2 (2025)  33.4 (2024)  35.8 (10/10/2024)    Weight:  Wt Readings from Last 6 Encounters:   25 77.5 kg (170 lb 13.7 oz)   24 83 kg (182 lb 15.7 oz)   24 86.2 kg (190 lb)   10/29/24 88.9 kg (196 lb)   10/10/24 88.9 kg (195 lb 15.8 oz)   24 94.3 kg (208 lb)     Ideal/Adjusted Body Weight:  Ideal: 110 lbs  Adjusted: 134 lbs    Neck Circumference:   14 in    Waist  "Circumference:  38 in (2025)  39.5 in    Percent Body Fat:   53.4%  https://www.calculator.net/body-fat-calculator.html    InBody: none noted    Nutrition history (24 food recall)  Breakfast: none  Lunch: chicken and cabbage   Dinner: chicken breast   Snack: 2 sugar free apple sauce   Water: 40 oz/day  Sugar Sweetened beverages: zero sugar soda   Etoh: denies  Satiety cues: before starting medication, would have to eat large amounts to feel full.   Cravings: denies  Food noise: yes  Dietician: denies    Current physical activity:   None at this time,  on 09/10/2024  Barriers: recent     Stressors/Mental Health   New baby at home   Over the last two weeks how often have you been bothered by little interest or pleasure in doing things: 0  Over the last two weeks how often have you been bothered by feeling down, depressed or hopeless: 0  PHQ-2 Total Score: 0    Sleep habits   Hasn't been regular since delivery but does sleep ok    CVD screening  The ASCVD Risk score (Ronal BARRON, et al., 2019) failed to calculate for the following reasons:    The 2019 ASCVD risk score is only valid for ages 40 to 79    LMP: Patient's last menstrual period was 2025 (approximate).    Contraception: OCP      I spent 11 minutes counseling patient on diet, physical activity and reviewing labs.     Review of patient's allergies indicates:  No Known Allergies  Current Outpatient Medications   Medication Instructions    EPINEPHrine (EPIPEN 2-ASHLEY) 0.3 mg, Intramuscular, Once    JUNEL FE 1.5, 28, 1.5 mg-30 mcg (21)/75 mg (7) tablet 1 tablet, Oral, Daily    OZEMPIC 0.5 mg, Subcutaneous, Every 7 days    pen needle, diabetic 31 gauge x 3/16" Ndle 1 each, Misc.(Non-Drug; Combo Route), Weekly     Past Surgical History:   Procedure Laterality Date     SECTION       family history is not on file.    Social History     Socioeconomic History    Marital status:    Tobacco Use    Smoking status: Never    " Smokeless tobacco: Never   Substance and Sexual Activity    Alcohol use: Yes     Comment: rarely    Drug use: Never    Sexual activity: Yes     Partners: Male     Birth control/protection: None     Social Drivers of Health     Financial Resource Strain: Patient Declined (9/10/2024)    Received from [facility]    Overall Financial Resource Strain (CARDIA)     Difficulty of Paying Living Expenses: Patient declined   Food Insecurity: Patient Declined (9/10/2024)    Received from [facility]    Hunger Vital Sign     Worried About Running Out of Food in the Last Year: Patient declined     Ran Out of Food in the Last Year: Patient declined   Transportation Needs: Patient Declined (9/10/2024)    Received from [facility]    PRAPARE - Transportation     Lack of Transportation (Medical): Patient declined     Lack of Transportation (Non-Medical): Patient declined   Physical Activity: Unknown (11/25/2024)    Exercise Vital Sign     Days of Exercise per Week: 3 days   Stress: Patient Declined (9/10/2024)    Received from [facility]    [employer] [employer] - Occupational Stress Questionnaire     Feeling of Stress : Patient declined   Housing Stability: Patient Declined (9/10/2024)    Received from [facility]    Housing Stability Vital Sign     Unable to Pay for Housing in the Last Year: Patient declined     Number of Times Moved in the Last Year: 0     Homeless in the Last Year: Patient declined       Subjective:     Review of Systems   Constitutional: Negative.    HENT: Negative.     Eyes: Negative.    Respiratory: Negative.     Cardiovascular: Negative.    Gastrointestinal: Negative.    Endocrine: Negative.    Genitourinary: Negative.    Musculoskeletal: Negative.    Skin: Negative.    Allergic/Immunologic: Negative.    Neurological: Negative.    Hematological: Negative.    Psychiatric/Behavioral: Negative.       Objective:     Visit Vitals  /68 (BP Location: Left arm,  "Patient Position: Sitting)   Pulse 75   Resp 18   Ht 5' 2" (1.575 m)   Wt 77.5 kg (170 lb 13.7 oz)   LMP 03/27/2025 (Approximate)   SpO2 98%   BMI 31.25 kg/m²       Physical Exam  Vitals reviewed.   Constitutional:       Appearance: Normal appearance. She is obese.   HENT:      Head: Normocephalic and atraumatic.   Eyes:      Extraocular Movements: Extraocular movements intact.      Conjunctiva/sclera: Conjunctivae normal.      Pupils: Pupils are equal, round, and reactive to light.   Cardiovascular:      Rate and Rhythm: Normal rate and regular rhythm.      Heart sounds: Normal heart sounds.   Pulmonary:      Effort: Pulmonary effort is normal.      Breath sounds: Normal breath sounds.   Abdominal:      General: Bowel sounds are normal.   Musculoskeletal:         General: Normal range of motion.      Cervical back: Normal range of motion.   Skin:     General: Skin is warm and dry.   Neurological:      Mental Status: She is alert and oriented to person, place, and time.   Psychiatric:         Mood and Affect: Mood normal.         Behavior: Behavior normal.       Labs Reviewed:     Hematology:  Lab Results   Component Value Date    WBC 9.58 06/28/2024    HGB 11.4 (L) 06/28/2024    HCT 35.1 (L) 06/28/2024     06/28/2024     Chemistry:  Lab Results   Component Value Date    BUN 8 05/17/2024    CREATININE 0.6 05/17/2024    EGFRNORACEVR >60.0 05/17/2024    AST 10 05/17/2024    ALT 9 (L) 05/17/2024      Lab Results   Component Value Date    HGBA1C 5.3 08/20/2024      Lipid Panel:  No results found for: "CHOL", "HDL", "LDL", "TRIG", "TOTALCHOLEST"   Thyroid:  Lab Results   Component Value Date    TSH 1.582 12/31/2024      Urine:  Lab Results   Component Value Date    APPEARANCEUA Clear 07/15/2024    PROTEINUA Negative 07/15/2024    LEUKOCYTESUR Negative 07/15/2024    CREATRANDUR 72.4 05/17/2024    PROTEINURINE <7 05/17/2024        Assessment:       ICD-10-CM ICD-9-CM   1. History of insulin resistance  Z86.39 V12.29 " "  2. Class 1 obesity due to excess calories without serious comorbidity with body mass index (BMI) of 34.0 to 34.9 in adult  E66.811 278.00    E66.09 V85.34    Z68.34        Plan:     1. History of insulin resistance (Primary)  GLP-1 improves insuling resistance of adipose tissue in obese humans.   Weight loss and exercise can help reverse insulin resistance.   Aerobic activity for 20-30 minutes 5 days a week.  Recommend Low carbohydrate or Mediterranean diet.  Continue Semaglutide to 0.5 mg SC weekly    2. Class 2 obesity due to excess calories without serious comorbidity with body mass index (BMI) of 35.0 to 35.9 in adult  Body mass index is 31.25 kg/m².  Wt Readings from Last 1 Encounters:   03/28/25 77.5 kg (170 lb 13.7 oz)   Waist Circumference: 38 in  TSH   Date Value Ref Range Status   12/31/2024 1.582 0.400 - 4.000 uIU/mL Final     No results found for: "[id number]"     A modest (5-10%) weight loss improves health and quality of life.     Goal: 160 lbs  Short-term: lose 10 lbs in 12 weeks  Long-term: lose 35 lbs in 1 year  *Keep in mind that, on average, you may lose 1-2 lbs per week*    Willingness to change: 8/10    Patient qualifies for anti-obesity medications due to BMI 33. Anti-obesity medications are an adjunct to nutritional, physical activity, and behavioral therapies. Medication alone cannot treat obesity. I recommend starting medication to significantly improve patient's risk factor/s.   Medication:   Semaglutide  Dosing: continue dose to 0.5 mg SC weekly   Discussed adding Wellbutrin but patient would like to hold off on that for now.     Nutrition: 1,650 calories per day.      Protein: goal is 90 g/day. Protein has a slower rate of digestion = greater satiety (fullness).        Aim for <25g of added sugar per day.      Recommend to increase fiber intake. www.fullplateliving.org      Eat Fit Shopping List.      Prepare meals in advance.      Eat breakfast within 2 hours of waking up.       " Track what you eat. Research shows that people who track their food intake are more successful with weight management. This creates awareness of what you are eating/drinking and can help you see where to make changes.       Get to know your plate.  www.myplate.gov      Stay hydrated.     Activity: 2-3 days of strength training. This can be body weight, light weight or elastic bands exercises. [employer] and [employer] are great sources for free workout plans/videos.     Start slowly with an activity you enjoy and make it a habit.     Ask a family member or friend to get active with you.     Aim for 5,000 - 10,000 steps per day     Schedule time to make physical activity a part of your daily routine.     Exercise prescription: patient still on pelvic rest from recent delivery    Sleep: 7-8 hours of sleep a night    *Recognize your progress and remember that each day is a new day*  *Stay on track, even when you feel like you're not making progress*  *Sit Less, Stand Often, Move More*  *You don't have to be perfect; be persistent*    Avoiding Weight Regain:  - continued with modified food intake (changed eating habits)  - eat breakfast every day  - weigh yourself at least once a week  - watch less than 10 hours of TV per week  - continue with increased physical activity  - physical activity, on average, about 1 hour per day      Follow up in about 8 weeks (around 5/23/2025) for Virtual Visit. In addition to their scheduled follow up, the patient has also been instructed to follow up on as needed basis.     Shara Mcgee, THIEN

## 2025-05-23 ENCOUNTER — TELEPHONE (OUTPATIENT)
Dept: PRIMARY CARE CLINIC | Facility: CLINIC | Age: 30
End: 2025-05-23

## 2025-05-23 ENCOUNTER — OFFICE VISIT (OUTPATIENT)
Dept: PRIMARY CARE CLINIC | Facility: CLINIC | Age: 30
End: 2025-05-23
Payer: COMMERCIAL

## 2025-05-23 DIAGNOSIS — E66.811 CLASS 1 OBESITY DUE TO EXCESS CALORIES WITHOUT SERIOUS COMORBIDITY WITH BODY MASS INDEX (BMI) OF 34.0 TO 34.9 IN ADULT: Chronic | ICD-10-CM

## 2025-05-23 DIAGNOSIS — Z86.39 HISTORY OF INSULIN RESISTANCE: ICD-10-CM

## 2025-05-23 DIAGNOSIS — E66.09 CLASS 1 OBESITY DUE TO EXCESS CALORIES WITHOUT SERIOUS COMORBIDITY WITH BODY MASS INDEX (BMI) OF 34.0 TO 34.9 IN ADULT: Chronic | ICD-10-CM

## 2025-05-23 RX ORDER — SEMAGLUTIDE 1.34 MG/ML
1 INJECTION, SOLUTION SUBCUTANEOUS
Qty: 3 ML | Refills: 1 | Status: SHIPPED | OUTPATIENT
Start: 2025-05-23

## 2025-05-23 NOTE — PROGRESS NOTES
Patient ID: [id number]     Chief Complaint: Follow-up    HPI:     The patient location is: [place]  The chief complaint leading to consultation is: follow up    Visit type: audiovisual    Face to Face time with patient: 12 minutes  20 minutes of total time spent on the encounter, which includes face to face time and non-face to face time preparing to see the patient (eg, review of tests), Obtaining and/or reviewing separately obtained history, Documenting clinical information in the electronic or other health record, Independently interpreting results (not separately reported) and communicating results to the patient/family/caregiver, or Care coordination (not separately reported).     Each patient to whom he or she provides medical services by telemedicine is:  (1) informed of the relationship between the physician and patient and the respective role of any other health care provider with respect to management of the patient; and (2) notified that he or she may decline to receive medical services by telemedicine and may withdraw from such care at any time.      Amanda Mcbride is a 29 y.o. female with diagnosis of IR, Hypothyroidism, Obesity. Patient referred by PCP. Patient's PCP is Vangie Huynh NP. Patient seen in clinic today for Obesity. Patient last seen in clinic on 2025.     Weight history   Started weight watchers after birth of first child, did lose significant amount of weight but stopped after a year due to a [place] trip. Started on Ozempic per Dr. Goode in , had weight loss but wanted to conceive 2nd child so stopped medication. Patient did have delivery of second child on 09/10/2024 by . Patient states she is no longer breast feeding and is starting OCP this coming .   2024: at previous appt, patient started on Ozempic 0.25 mg weekly. Patient states tolerating well. Patient states she is doing weight watchers and losing 2-3 lbs/month. States current weight is 185.6  lbs.   2024: at previous appt, patient continued on Ozempic 0.25 mg weekly. Patient states tolerating medication well. States feels appetite increasing, wanting to snack more. Using Weight Watchers sumaya. Trying to hit protein goal.   2025: at previous appt, Ozempic increased to 0.5 mg weekly. Tolerating well. Aiming for protein goal, having difficulty reaching goal of 95 g/day. Medication helping with weight loss, still feels hungry after eating but is able to not eat more.   2025: at previous appt, Ozempic 0.5 mg weekly continued. Tolerating well. Feels her hunger/appetite is increasing. Aiming for protein goal. Recent weight at home is 166 lbs.     Previous Obesity Treatment:   Ozempic - successful weight loss  Weight watchers - successful weight loss    BMI:  31.2 (2025)  33.4 (2024)  35.8 (10/10/2024)    Weight:  Wt Readings from Last 6 Encounters:   25 77.5 kg (170 lb 13.7 oz)   24 83 kg (182 lb 15.7 oz)   24 86.2 kg (190 lb)   10/29/24 88.9 kg (196 lb)   10/10/24 88.9 kg (195 lb 15.8 oz)   24 94.3 kg (208 lb)     Ideal/Adjusted Body Weight:  Ideal: 110 lbs  Adjusted: 134 lbs    Neck Circumference:   14 in    Waist Circumference:  38 in (2025)  39.5 in    Percent Body Fat:   53.4%  https://www.calculator.net/body-fat-calculator.html    InBody: none noted    Nutrition history (24 food recall)  Breakfast: none  Lunch: chicken caesar salad   Dinner: gumbo with no rice   Snack: chips   Water: 40 oz/day  Sugar Sweetened beverages: zero sugar soda   Etoh: denies  Satiety cues: before starting medication, would have to eat large amounts to feel full.   Cravings: denies  Food noise: yes  Dietician: denies    Current physical activity:   None at this time,  on 09/10/2024  Barriers: recent     Stressors/Mental Health   New baby at home   Over the last two weeks how often have you been bothered by little interest or pleasure in doing things:  "0  Over the last two weeks how often have you been bothered by feeling down, depressed or hopeless: 0  PHQ-2 Total Score: 0    Sleep habits   Hasn't been regular since delivery but does sleep ok    CVD screening  The ASCVD Risk score (Ronal BARRON, et al., 2019) failed to calculate for the following reasons:    The 2019 ASCVD risk score is only valid for ages 40 to 79    LMP: No LMP recorded.    Contraception: OCP      I spent 8 minutes counseling patient on diet, physical activity and reviewing labs.     Review of patient's allergies indicates:  No Known Allergies  Current Outpatient Medications   Medication Instructions    EPINEPHrine (EPIPEN 2-ASHLEY) 0.3 mg, Intramuscular, Once    JUNEL FE 1.5, 28, 1.5 mg-30 mcg (21)/75 mg (7) tablet 1 tablet, Oral, Daily    OZEMPIC 1 mg, Subcutaneous, Every 7 days    pen needle, diabetic 31 gauge x 3/16" Ndle 1 each, Misc.(Non-Drug; Combo Route), Weekly     Past Surgical History:   Procedure Laterality Date     SECTION       family history is not on file.    Social History     Socioeconomic History    Marital status:    Tobacco Use    Smoking status: Never    Smokeless tobacco: Never   Substance and Sexual Activity    Alcohol use: Yes     Comment: rarely    Drug use: Never    Sexual activity: Yes     Partners: Male     Birth control/protection: None     Social Drivers of Health     Financial Resource Strain: Patient Declined (2025)    Overall Financial Resource Strain (CARDIA)     Difficulty of Paying Living Expenses: Patient declined   Food Insecurity: Patient Declined (2025)    Hunger Vital Sign     Worried About Running Out of Food in the Last Year: Patient declined     Ran Out of Food in the Last Year: Patient declined   Transportation Needs: Patient Declined (2025)    PRAPARE - Transportation     Lack of Transportation (Medical): Patient declined     Lack of Transportation (Non-Medical): Patient declined   Physical Activity: Inactive (2025) " "   Exercise Vital Sign     Days of Exercise per Week: 0 days     Minutes of Exercise per Session: 0 min   Stress: Patient Declined (5/23/2025)    [place] [employer] - Occupational Stress Questionnaire     Feeling of Stress : Patient declined   Housing Stability: Patient Declined (5/23/2025)    Housing Stability Vital Sign     Unable to Pay for Housing in the Last Year: Patient declined     Homeless in the Last Year: Patient declined       Subjective:     Review of Systems   Constitutional: Negative.    HENT: Negative.     Eyes: Negative.    Respiratory: Negative.     Cardiovascular: Negative.    Gastrointestinal: Negative.    Endocrine: Negative.    Genitourinary: Negative.    Musculoskeletal: Negative.    Skin: Negative.    Allergic/Immunologic: Negative.    Neurological: Negative.    Hematological: Negative.    Psychiatric/Behavioral: Negative.       Objective:     There were no vitals taken for this visit.      Physical Exam  Neurological:      Mental Status: She is alert and oriented to person, place, and time.   Psychiatric:         Mood and Affect: Mood normal.       Labs Reviewed:     Hematology:  Lab Results   Component Value Date    WBC 9.58 06/28/2024    HGB 11.4 (L) 06/28/2024    HCT 35.1 (L) 06/28/2024     06/28/2024     Chemistry:  Lab Results   Component Value Date    BUN 8 05/17/2024    CREATININE 0.6 05/17/2024    EGFRNORACEVR >60.0 05/17/2024    AST 10 05/17/2024    ALT 9 (L) 05/17/2024      Lab Results   Component Value Date    HGBA1C 5.3 08/20/2024      Lipid Panel:  No results found for: "CHOL", "HDL", "LDL", "TRIG", "TOTALCHOLEST"   Thyroid:  Lab Results   Component Value Date    TSH 1.582 12/31/2024      Urine:  Lab Results   Component Value Date    APPEARANCEUA Clear 07/15/2024    PROTEINUA Negative 07/15/2024    LEUKOCYTESUR Negative 07/15/2024    CREATRANDUR 72.4 05/17/2024    PROTEINURINE <7 05/17/2024        Assessment:       ICD-10-CM ICD-9-CM   1. History of " "insulin resistance  Z86.39 V12.29   2. Class 1 obesity due to excess calories without serious comorbidity with body mass index (BMI) of 34.0 to 34.9 in adult  E66.811 278.00    E66.09 V85.34    Z68.34        Plan:     1. History of insulin resistance (Primary)  GLP-1 improves insuling resistance of adipose tissue in obese humans.   Weight loss and exercise can help reverse insulin resistance.   Aerobic activity for 20-30 minutes 5 days a week.  Recommend Low carbohydrate or Mediterranean diet.  Continue Semaglutide to 0.5 mg SC weekly    2. Class 2 obesity due to excess calories without serious comorbidity with body mass index (BMI) of 35.0 to 35.9 in adult  There is no height or weight on file to calculate BMI.  Wt Readings from Last 1 Encounters:   03/28/25 77.5 kg (170 lb 13.7 oz)   Waist Circumference: 38 in  TSH   Date Value Ref Range Status   12/31/2024 1.582 0.400 - 4.000 uIU/mL Final     No results found for: "[id number]"     A modest (5-10%) weight loss improves health and quality of life.     Goal: 160 lbs  Short-term: lose 10 lbs in 12 weeks  Long-term: lose 35 lbs in 1 year  *Keep in mind that, on average, you may lose 1-2 lbs per week*    Willingness to change: 8/10    Patient qualifies for anti-obesity medications due to BMI 33. Anti-obesity medications are an adjunct to nutritional, physical activity, and behavioral therapies. Medication alone cannot treat obesity. I recommend starting medication to significantly improve patient's risk factor/s.   Medication:   Increase Semaglutide to 1 mg SC weekly     Nutrition: 1,650 calories per day.      Protein: goal is 90 g/day. Protein has a slower rate of digestion = greater satiety (fullness).        Aim for <25g of added sugar per day.      Recommend to increase fiber intake. Women <51 y/o - 25g/day. www.fullplateliNeurOp.org      Eat Fit Shopping List.      Prepare meals in advance.      Eat breakfast within 2 hours of waking up.       Track what you eat. " Research shows that people who track their food intake are more successful with weight management. This creates awareness of what you are eating/drinking and can help you see where to make changes.       Get to know your plate.  www.myplate.gov      Stay hydrated.     Activity: 2-3 days of strength training. This can be body weight, light weight or elastic bands exercises. [employer] and [employer] are great sources for free workout plans/videos.     Start slowly with an activity you enjoy and make it a habit.     Ask a family member or friend to get active with you.     Aim for 5,000 - 10,000 steps per day     Schedule time to make physical activity a part of your daily routine.     Exercise prescription:     Frequency: 2 days/week    Intensity: low  Low: walking, swimming, cycling at a leisurely pace, yoga, karissa chi, light weight training, stretching, slow dancing, leisurely sports like table tennis, and light yard work     Type: Walk    Time: 15 minutes    Enjoyment: (make it fun)    Sleep: 7-8 hours of sleep a night    *Recognize your progress and remember that each day is a new day*  *Stay on track, even when you feel like you're not making progress*  *Sit Less, Stand Often, Move More*  *You don't have to be perfect; be persistent*    Avoiding Weight Regain:  - continued with modified food intake (changed eating habits)  - eat breakfast every day  - weigh yourself at least once a week  - watch less than 10 hours of TV per week  - continue with increased physical activity  - physical activity, on average, about 1 hour per day      Follow up in about 6 weeks (around 7/4/2025). In addition to their scheduled follow up, the patient has also been instructed to follow up on as needed basis.     Shara Mcgee, THIEN

## 2025-05-23 NOTE — PATIENT INSTRUCTIONS
Products  Isopure      Calories: 1,397  Protein: 118 g     Carbs: 131 g     Fat: 51 g     Fiber: 29 g    Breakfast: Egg [place]  263 Calories, 25 g protein, 27 g Carbs, 9 g Fat  Ingredients:   1 Iam Light English Muffin  1/2 cup liquid egg whites/egg beaters  2 slices Slovak victoria  1 [place] Ultra-Thin slice cheese   Directions:  Toast the English muffin  Cook the egg whites in a small skillet. Season with salt & pepper.   Assemble the sandwich by topping the English muffin with the cooked egg whites, Slovak victoria, and cheese. Enjoy!    Food Swaps  Iam Light English Muffin Iam Bagel Thin  2 Slices Kaylin Selvin Delightful Bread  1 soft-taco size Emerson Carb Balance tortilla    2 Slices Slovak Victoria 2 slices turkey victoria  1 ounces deli meat  2 slices center cut victoria   1 slice [place] Ultra-Thin Sliced Cheese 1 slice 2% American cheese  1 slice reduced-fat cheese       Lunch: Taco Salad  407 calories, 48 g protein, 30 g carbs, 12 g fat  Ingredients:   4 ounces 96/4 ground beef + 1 Tbsp taco seasoning  2 Tbsp [place] Cilantro Avocado dressing  1 Tbsp salsa  1/4 low-fat cottage cheese  1/4 cup black beans  1/4 cup corn  5 cherry tomatoes, sliced in half  1-2 cups chopped lettuce  5 black olives  1/4 cup fat-free shredded cheese  Directions:  Brown the ground beef and season with taco seasoning.   Assemble salad with all the ingredients. If meal prepping for later, put the wetter ingredients on the bottom. Store up to 5 days in fridge.     Food Swaps  4 ounces raw ground beef 4 ounces ground chicken or turkey  5 ounces shrimp   [place] Cilantro Avocado Dressing [place] Ranch Dressing  [place] Fat Free Ranch Dressing        Snack: Banana & PB  165 calories, 7 g protein, 32 g carbs, 2 g fat  Ingredients:   1 medium banana  2 Tbsp powdered peanut butter  Directions:   Mix powdered peanut butter with 1 Tbsp of water. (More if you want it less thick).   Top the banana with the mixed  peanut butter. Enjoy!    Dinner: [place] Chicken & Veggies  421 Calories, 26 g protein, 32 g carbs, 23 g fat  Ingredients:  4.5 ounces raw boneless, skinless chicken thighs  1/2 tsp salt  1/2 tsp garlic powder  1/2 tsp onion powder  Glaze: 1 Tbsp Harman's hot sauce, 1 tsp honey, 1 tsp lime juice  100 g sweet potatoes, cut into cubes (1 cup)  1 cup frozen broccoli florets  1 Tbsp butter  Directions:  Season chicken thigh with salt, garlic powder, and onion powder.   Place chicken in pan over medium-high heat. Cook for 2-3 minutes each side or until cooked until the internal temperature reaches 165 degrees.   Brush glaze over the chicken the last few minutes of cooking.   For the sweet potatoes: place cubes in a microwave safe bowl and microwave for 4-5 minutes or until soft. Mash them top with 1/2 Tbsp butter, salt, and pepper.   For the broccoli: microwave frozen broccoli 3-4 minutes and top with 1/2 Tbsp butter, salt, and pepper.     Food Swaps  4.5 ounces chicken thighs 4.5 ounces chicken breast  6 ounces shrimp   Sweet Potatoes Russet or red potatoes  White rice   Broccoli  Cauliflower  Green Beans  Springer sprouts        Snack: Edamame  140 calories, 12 g protein, 11 g carbs, 5 g fat  Ingredients:   1 (8 oz) bag of frozen edamame pods.   Salt or soy sauce, to taste  Directions:   Cook edamame according to package directions.   Toss in salt or soy sauce. Enjoy!       Breakfast Ideas  250 to 450 calories  At least 20 g of protein    360 calories, 24 g protein, 46 g carbs, 17 g fat  Veggies Made Great - 1 cinnamon roll muffins  3 Braulio See Gideon Sausage Links  1 cup fresh strawberries  312 calories, 28 g protein, 27 g carbs, 12 g fat  1 cup Oikos Triple Zero Greek yogurt  1/3 cup Ratio Keto Friendly Cereal  1/2 cup frozen or fresh berries  2 Tbsp chopped nuts  335 calories, 26 g protein, 57 g carbs, 3 g fat  1 serving [place] Cakes Flapjack & Waffle Mix  1 container (5.3 oz) low-sugar Greek yogurt such as Great  Value Light Greek Yogurt  1/2 cup berries  Sugar-free syrup  410 calories, 20 g protein, 35 g carbs, 23 g fat  2 eggs cooked with spray olive oil  2 slices Kaylin Selvin Delightful bread  1 portion container smashed avocado  1/2 cup chopped pineapple  475 calories, 28 g protein, 34 g carbs, 26 g fat  Omelet:   1 egg + 1/4 cup egg whites  Unlimited non-starchy vegetables (spinach, bell peppers, onions, mushrooms, etc)  2 slices [place]ormel [place] Cut Victoria  2 Tbsp reduced-fat shredded cheese  2/3 cup Simply Potatoes Shredded Hashbrowns cooked in air fryer  1 Tbsp ketchup  1 cup chopped cantaloupe  433 calories, 27 g protein, 58 g carbs, 12 g fat  Overnight Oats:  1/2 cup old fashioned or steal cut oats  2 Tbsp cheo seeds  1/2 cup Oikos Greek yogurt (or 5.3 oz container)  1/2 cup Fairlife 2% milk  Dash salt  Top with 1/2 cup fresh or frozen berries  Optional add-ins: 1-2 Tbsp chopped nuts, mini chocolate chips, unsweetened coconut, cinnamon, nutmeg, vanilla, protein powder, sugar-free pudding mix, powdered peanut butter  317 calories, 27 g protein, 29 g carbs, 14 g fat  Breakfast sandwich  Iam Light Multi-Grain English Muffin  1 egg cooked with olive oil spray  2 slices Ecuadorean victoria  1 slice 2% American cheese (or [place] Ultra-Thin sliced cheese)    Lunch Ideas  350 to 500 calories  At least 30 g protein    Nutrition: Varies  Building A Better-For-You Salad  Start with a base of fresh greens: arugula, spinach, butter lettuce, mixed greens, kale, nicole  Add at least 2 - 3 veggies  Raw veggies add a nice crunch  Grilled or roasted veggies add a nice charred, caramelized flavor  Pickled veggies are great for adding a hint of sweet/sour flavor, while fermented veggies give you added probiotic benefits.   Add a lean protein (3 - 4 oz.)  Grilled chicken, turkey, tuna/salmon packet, shrimp, tofu, steak  Add high fiber carbs (1/2 cup)  Beans & Quinoa  Grains: whole grain, chickpea pasta, farro, bulgar  Starchy veggies:  potatoes, winter squash, corn  Fruit: berries, grapes, chopped apples, etc.  Add texture + flavor (choose 1 to 2 items, approx 2 Tbsp of each item)  Avocado  Cheese  Hummus  Nuts  Seeds  Dress it up  Choose ligh/low-fat options: Oumars Own Light dressings and ExtraFootie Farms are great options  Make your own: oil + vinegar + seasonings + a little bit of Álvaro mustard    Try a salad kit (<400 calories including dressing) and top with a small can or packet marisela/salmon or 3-4 oz rotisserie chicken)  388 calories, 37 g protein, 24 g carbs, 16 g fat  Build a better sandwich  2 slices Kaylin Selvin Delightful Wheat Bread (or other whole grain, thin sliced bread)  3-4 oz deli meat  1 sliced reduced-fat cheese or 1/4 sliced avocado  Unlimited non-starchy vegetables such as lettuce, tomato, onion, pickle, etc.   1 Tbsp suace (goncalves, Ranch, honey mustard, BBQ sauce, hummus), unlimited yellow or brown mustard.   1 cup raw non-starchy vegetables (baby carrots, sugar snap peas, cucumbers, etc) or 1/2 cup cooked non-starchy vegetables.   382 calories, 34 g protein, 30 g carbs, 14 g fat  Snack Box Lunch  2 boiled eggs   2 oz deli meat  1/2 bell pepper  11 mini pretzels  1/2 cup grapes  Light string cheese  386 calories, 27 g protein, 47 g carbs, 10 g fat  Snack Box Lunch  1 serving Nut Thins  Light string cheese  Tuna pack, any flavor  1 cup chopped strawberries  Fiber One brownie bar  1/2 cup blueberries  403 calories, 32 g protein, 44 g carbs, 11 g fat  Snack Box Lunch  1 oz pretzel crisps  1/2 cup apple, sliced  2 slices ultra-thin provolone cheese  2 oz rotisserie chicken  4 Tbsp powdered peanut butter mixed with water (instructions on packet)  1/2 cup blackberries  446 calories, 36 g protein, 26 g carbs, 22 g fat  Snack Box Lunch  2 Tbsp hummus  3 oz baby carrots  2 hard boiled eggs  1/2 cup grapes  2 oz deli turkey wrapped around 2 sticks low fat string cheese  400 calories, 27 g protein, 54 g carbs, 9 g fat  Tuna Salad  1 can (5  oz) chunk light tuna in water  2 Tbsp Fage 0% Greek Yogurt or olive oil goncalves  Chopped celery & onion  Salt, pepper, lemon juice to taste  Serve with 11 Triscuit Thin Crisps (any flavor variety)  1 medium apple + 2 Tbsp brownie batter hummus  433 calories, 37 g protein, 41 g carbs, 19 g fat  Chicken Caesar Wrap  3 oz rotisserie chicken  2 Tbsp [place] Creamy Caesar dressing  2 Tbsp Parmeasan cheese  1 cup chopped nicole  1-2 [place] Carb Balance Sundried Tomato Basil tortillas   1 serving Popcorners Chips    Dinner Ideas  350 to 500 calories  At least 30 g protein    Nutrition: varies  3 to 5 oz baked, grilled, air fried, or broiled fish, seafood, or lean meat cooked with spray olive oil.   1 cup or more of cooked non-starchy vegetables   1 cup or less (100 g or less) carbohydrate of your choice  500 calories, 46 g protein, 42 g carbs, 16 g fat  Build a grain/power bowl  1 cup quinoa  2 oz sirloin steak  1 cup chopped broccoli  1 Tbsp Lemon Basil Vinaigrette  Nutrition: varies  Build a grain/power bowl  Choose a starch (up to 1 cup, feel free to mix/match)  Brown rice, farro, quinoa, millet, barley, couscous  White or sweet potatoes  Marcelline squash  Choose a protein (3-4 oz)  [place], steak (lean cut), chicken, shrimp, edamame, tofu  Vegetables (unlimited amounts)  Try them roasted, steamed, or grilled for variety  Eggplant, broccoli, carrots, bell peppers, green beans, radishes, cucumbers, parsnips, lettuce, cabbage  Dressing/sauce  Vinaigrette, yogurt, warm broth, hot sauce, soy cause, chimichurri, pesto, or any combinations  Garnish (optional)  Fresh herbs, micro greens, nuts, seeds, toasted coconut (nuts/seeds/coconut contain a lot of calories and should be used sparingly)  433 calories, 42 g protein, 40 g carbs, 25 g fat  Tacos  1 or 2 Carb Balance tortillas  1/4 cup cheese of choice  2 Tbsp sour cream  3-4 oz lean (93/7) ground beef or turkey or rotisserie chicken)  412 calories, 35 g protein, 41 g carbs, 12 g  fat  Better for you pizza  2-Ingredient High Protein Dough  1/4 cup red sauce  1/3 cup cheese  2 to 4 oz protein (rotisserie chicken, turkey pepperoni, sliced grilled chicken/steak)  Veggies of choice  Serve with at least 1 cup of non-starchy vegetables or side salad  Nutrition: 433 calories, 35 g protein, 44 g carbs, 13 g fat (if made with chicken sausage, marinara, steamed broccoli)   Better for you pasta  1 cup cooked Banza/Whole Wheat/Barilla Protein+ pasta  2-4 oz lean protein  1/4 cup trupti sauce, 1/2 cup red sauce, or 1-2 Tbsp pesto   Serve with 1-2 cups non-starchy vegetables   Nutrition: 450 calories, 36 g protein, 54 g carbs, 12 g fat  2 Tbsp [place] Sugar Free Stir Gee Sauce  1/2 cup cauliflower rice mixed with 1/2 cup white rice  3 oz rotisserie chicken  1 cup chopped broccoli  2 cream cheese rangoons (no sauce)     Snack Ideas:   <200 calories, needed if going >6 hours without fueling    Choose one of the followin/2 cup Good Culture Low-Fat cottage cheese + 1/4 cup freeze dried blueberries  1 container (5.3 oz) low-sugar Greek yogurt + 1/4 cup low-sugar cereal  2 light mozzarella cheese sticks + 1 individual bag of Pop Chips  1/4 cup or individual portion bag of nuts + 1 serving Bare Apple Chips  5 whole grain crackers + 2 Light Babebel Cheese + 1 small pederson pepper  1 medium apple + 2 Tbsp powdered peanut butter rehydrated  [place] roll up  1 Carb Balance tortilla + 2-3 slices turkey deli meat + 1 Tbsp Olive oil may   Snack Pack Edamame  1 cup grapes + 2 light mozzarella cheese sticks  1-2 Chomps sticks + 1 piece of fruit  Protein bar  <150 calories and >10 g protein  Protein shake:   >/= 20 g protein  <170 calories   Sweet Treats ok to build 1-3 times per week  1 cup berries + dollop whipped cream  Outshine Fruit Bars  2/3 cup [place] Carb Smart Ice Cream  2 Tbsp brownie batter hummus + 1/2 to 1 cup fruit  Fage 0% Greek yogurt cup + 1-2 Tbsp sugar-free pudding mix  Sugar-free jello or pudding  cup  Dannon Light & Fit dessert flavored Greek yogurts  Any sweet treat under 200 calories (think mini/fun-sized)  Chocolate Dipped Banana Bites  Raspberry Lemon Greek Frozen Yogurt Bark  All the Best Banana Nice Cream Recipes      Calorie Swaps:  Sweet Baby Rays BBQ sauce - Sweet Baby Rays Zero Sugar BBQ sauce  Greek Gods Honey Vanilla Greek Yogurt - Oikos Triple Zero Vanilla Greek Yogurt  Ice cream sandwich - Skinny Cow No Sugar Added Ice Cream Bar  Butter - [place] O'Lakes Light Butter  Rice - 50/50 mixture of rice & cauliflower rice  Cream cheese - Whipped cream cheese  Sliced cheese - Ultra-thin sliced cheese  Young/Sour Cream - Fage 0% Greek Yogurt  Syrup - Lite version  Strawberry jam - Sugar free preserves  Tortillas - Corn tortillas or Carb Smart Tortillas   Ranch - [place] ran   Vinaigrettes - Hafsa's Garlic & Red Wine Dressing  Coffee syrups - Sugar free options  Pasta - Spaghetti Squash  Young - Avocado oil may  Avocado - Portion control smashed avocado cups

## 2025-08-28 ENCOUNTER — PATIENT MESSAGE (OUTPATIENT)
Dept: PRIMARY CARE CLINIC | Facility: CLINIC | Age: 30
End: 2025-08-28
Payer: COMMERCIAL